# Patient Record
Sex: FEMALE | Race: WHITE | NOT HISPANIC OR LATINO | Employment: FULL TIME | URBAN - NONMETROPOLITAN AREA
[De-identification: names, ages, dates, MRNs, and addresses within clinical notes are randomized per-mention and may not be internally consistent; named-entity substitution may affect disease eponyms.]

---

## 2017-01-16 ENCOUNTER — TRANSCRIBE ORDERS (OUTPATIENT)
Dept: ADMINISTRATIVE | Facility: HOSPITAL | Age: 47
End: 2017-01-16

## 2017-01-16 ENCOUNTER — LAB (OUTPATIENT)
Dept: LAB | Facility: HOSPITAL | Age: 47
End: 2017-01-16

## 2017-01-16 DIAGNOSIS — Z00.00 ROUTINE GENERAL MEDICAL EXAMINATION AT A HEALTH CARE FACILITY: ICD-10-CM

## 2017-01-16 DIAGNOSIS — Z00.00 ROUTINE GENERAL MEDICAL EXAMINATION AT A HEALTH CARE FACILITY: Primary | ICD-10-CM

## 2017-01-16 LAB
ALBUMIN SERPL-MCNC: 4.1 G/DL (ref 3.5–5)
ALBUMIN/GLOB SERPL: 1.2 G/DL (ref 1.1–2.5)
ALP SERPL-CCNC: 100 U/L (ref 24–120)
ALT SERPL W P-5'-P-CCNC: 28 U/L (ref 0–54)
ANION GAP SERPL CALCULATED.3IONS-SCNC: 11 MMOL/L (ref 4–13)
AST SERPL-CCNC: 18 U/L (ref 7–45)
AUTO MIXED CELLS #: 0.6 10*3/UL (ref 0.1–2.6)
AUTO MIXED CELLS %: 10.6 % (ref 0.1–24)
BACTERIA UR QL AUTO: ABNORMAL /HPF
BILIRUB SERPL-MCNC: 0.4 MG/DL (ref 0.1–1)
BILIRUB UR QL STRIP: NEGATIVE
BUN BLD-MCNC: 9 MG/DL (ref 5–21)
BUN/CREAT SERPL: 16.4
CALCIUM SPEC-SCNC: 8.5 MG/DL (ref 8.4–10.4)
CHLORIDE SERPL-SCNC: 103 MMOL/L (ref 98–110)
CHOLEST SERPL-MCNC: 180 MG/DL (ref 130–200)
CLARITY UR: CLEAR
CO2 SERPL-SCNC: 24 MMOL/L (ref 24–31)
COLOR UR: YELLOW
CREAT BLD-MCNC: 0.55 MG/DL (ref 0.5–1.4)
ERYTHROCYTE [DISTWIDTH] IN BLOOD BY AUTOMATED COUNT: 14.4 % (ref 12–15)
GFR SERPL CREATININE-BSD FRML MDRD: 118 ML/MIN/1.73
GLOBULIN UR ELPH-MCNC: 3.5 GM/DL
GLUCOSE BLD-MCNC: 89 MG/DL (ref 70–100)
GLUCOSE UR STRIP-MCNC: NEGATIVE MG/DL
HCT VFR BLD AUTO: 35.6 % (ref 37–47)
HDLC SERPL-MCNC: 36 MG/DL
HGB BLD-MCNC: 11.5 G/DL (ref 12–16)
HGB UR QL STRIP.AUTO: ABNORMAL
HYALINE CASTS UR QL AUTO: ABNORMAL /LPF
KETONES UR QL STRIP: NEGATIVE
LDLC SERPL CALC-MCNC: 119 MG/DL (ref 0–99)
LDLC/HDLC SERPL: 3.29 {RATIO}
LEUKOCYTE ESTERASE UR QL STRIP.AUTO: NEGATIVE
LYMPHOCYTES # BLD AUTO: 1.6 10*3/MM3 (ref 0.8–7)
LYMPHOCYTES NFR BLD AUTO: 25.8 % (ref 15–45)
MCH RBC QN AUTO: 24.8 PG (ref 28–32)
MCHC RBC AUTO-ENTMCNC: 32.3 G/DL (ref 33–36)
MCV RBC AUTO: 76.9 FL (ref 82–98)
NEUTROPHILS # BLD AUTO: 3.9 10*3/MM3 (ref 1.5–8.3)
NEUTROPHILS NFR BLD AUTO: 63.6 % (ref 39–78)
NITRITE UR QL STRIP: NEGATIVE
PH UR STRIP.AUTO: 6 [PH] (ref 5–8)
PLATELET # BLD AUTO: 321 10*3/MM3 (ref 130–400)
PMV BLD AUTO: 8.4 FL (ref 6–12)
POTASSIUM BLD-SCNC: 4.3 MMOL/L (ref 3.5–5.3)
PROT SERPL-MCNC: 7.6 G/DL (ref 6.3–8.7)
PROT UR QL STRIP: NEGATIVE
RBC # BLD AUTO: 4.63 10*6/MM3 (ref 4.2–5.4)
RBC # UR: ABNORMAL /HPF
REF LAB TEST METHOD: ABNORMAL
SODIUM BLD-SCNC: 138 MMOL/L (ref 135–145)
SP GR UR STRIP: >=1.03 (ref 1–1.03)
SQUAMOUS #/AREA URNS HPF: ABNORMAL /HPF
TRIGL SERPL-MCNC: 127 MG/DL (ref 0–149)
TSH SERPL DL<=0.05 MIU/L-ACNC: 2.02 MIU/ML (ref 0.47–4.68)
UROBILINOGEN UR QL STRIP: ABNORMAL
VLDLC SERPL-MCNC: 25.4 MG/DL
WBC NRBC COR # BLD: 6.1 10*3/MM3 (ref 4.8–10.8)
WBC UR QL AUTO: ABNORMAL /HPF

## 2017-01-16 PROCEDURE — 84443 ASSAY THYROID STIM HORMONE: CPT | Performed by: NURSE PRACTITIONER

## 2017-01-16 PROCEDURE — 87624 HPV HI-RISK TYP POOLED RSLT: CPT | Performed by: NURSE PRACTITIONER

## 2017-01-16 PROCEDURE — 80061 LIPID PANEL: CPT

## 2017-01-16 PROCEDURE — 80053 COMPREHEN METABOLIC PANEL: CPT

## 2017-01-16 PROCEDURE — 87086 URINE CULTURE/COLONY COUNT: CPT | Performed by: NURSE PRACTITIONER

## 2017-01-16 PROCEDURE — 36415 COLL VENOUS BLD VENIPUNCTURE: CPT

## 2017-01-16 PROCEDURE — G0123 SCREEN CERV/VAG THIN LAYER: HCPCS | Performed by: NURSE PRACTITIONER

## 2017-01-16 PROCEDURE — 85025 COMPLETE CBC W/AUTO DIFF WBC: CPT

## 2017-01-16 PROCEDURE — 81001 URINALYSIS AUTO W/SCOPE: CPT

## 2017-01-17 ENCOUNTER — LAB REQUISITION (OUTPATIENT)
Dept: LAB | Facility: HOSPITAL | Age: 47
End: 2017-01-17

## 2017-01-17 DIAGNOSIS — Z00.00 ENCOUNTER FOR GENERAL ADULT MEDICAL EXAMINATION WITHOUT ABNORMAL FINDINGS: ICD-10-CM

## 2017-01-18 LAB — BACTERIA SPEC AEROBE CULT: ABNORMAL

## 2017-01-19 LAB
GEN CATEG CVX/VAG CYTO-IMP: NORMAL
LAB AP CASE REPORT: NORMAL
LAB AP GYN ADDITIONAL INFORMATION: NORMAL
Lab: NORMAL
PATH INTERP SPEC-IMP: NORMAL
STAT OF ADQ CVX/VAG CYTO-IMP: NORMAL

## 2017-02-20 ENCOUNTER — TRANSCRIBE ORDERS (OUTPATIENT)
Dept: ADMINISTRATIVE | Facility: HOSPITAL | Age: 47
End: 2017-02-20

## 2017-02-20 ENCOUNTER — LAB (OUTPATIENT)
Dept: LAB | Facility: HOSPITAL | Age: 47
End: 2017-02-20
Attending: PEDIATRICS

## 2017-02-20 DIAGNOSIS — R30.0 DYSURIA: ICD-10-CM

## 2017-02-20 DIAGNOSIS — R30.0 DYSURIA: Primary | ICD-10-CM

## 2017-02-20 LAB
BILIRUB UR QL STRIP: NEGATIVE
CLARITY UR: CLEAR
COLOR UR: YELLOW
GLUCOSE UR STRIP-MCNC: NEGATIVE MG/DL
HGB UR QL STRIP.AUTO: NEGATIVE
KETONES UR QL STRIP: NEGATIVE
LEUKOCYTE ESTERASE UR QL STRIP.AUTO: NEGATIVE
NITRITE UR QL STRIP: NEGATIVE
PH UR STRIP.AUTO: 6.5 [PH] (ref 5–8)
PROT UR QL STRIP: NEGATIVE
SP GR UR STRIP: 1.01 (ref 1–1.03)
UROBILINOGEN UR QL STRIP: NORMAL

## 2017-02-20 PROCEDURE — 81003 URINALYSIS AUTO W/O SCOPE: CPT

## 2017-03-17 ENCOUNTER — OFFICE VISIT (OUTPATIENT)
Dept: GASTROENTEROLOGY | Facility: CLINIC | Age: 47
End: 2017-03-17

## 2017-03-17 ENCOUNTER — APPOINTMENT (OUTPATIENT)
Dept: LAB | Facility: HOSPITAL | Age: 47
End: 2017-03-17

## 2017-03-17 VITALS
HEART RATE: 103 BPM | DIASTOLIC BLOOD PRESSURE: 70 MMHG | OXYGEN SATURATION: 97 % | WEIGHT: 147 LBS | BODY MASS INDEX: 27.75 KG/M2 | HEIGHT: 61 IN | SYSTOLIC BLOOD PRESSURE: 118 MMHG

## 2017-03-17 DIAGNOSIS — D64.9 ANEMIA, UNSPECIFIED TYPE: Primary | ICD-10-CM

## 2017-03-17 DIAGNOSIS — R19.5 HEME + STOOL: ICD-10-CM

## 2017-03-17 DIAGNOSIS — Z78.9 NONSMOKER: ICD-10-CM

## 2017-03-17 LAB
FERRITIN SERPL-MCNC: 4.25 NG/ML (ref 6.24–137)
IRON 24H UR-MRATE: 28 MCG/DL (ref 42–180)
IRON SATN MFR SERPL: 6 % (ref 20–45)
TIBC SERPL-MCNC: 460 MCG/DL (ref 225–420)

## 2017-03-17 PROCEDURE — 36415 COLL VENOUS BLD VENIPUNCTURE: CPT | Performed by: CLINICAL NURSE SPECIALIST

## 2017-03-17 PROCEDURE — 99204 OFFICE O/P NEW MOD 45 MIN: CPT | Performed by: CLINICAL NURSE SPECIALIST

## 2017-03-17 PROCEDURE — 83540 ASSAY OF IRON: CPT | Performed by: CLINICAL NURSE SPECIALIST

## 2017-03-17 PROCEDURE — 83550 IRON BINDING TEST: CPT | Performed by: CLINICAL NURSE SPECIALIST

## 2017-03-17 PROCEDURE — 82728 ASSAY OF FERRITIN: CPT | Performed by: CLINICAL NURSE SPECIALIST

## 2017-03-17 RX ORDER — SODIUM, POTASSIUM,MAG SULFATES 17.5-3.13G
SOLUTION, RECONSTITUTED, ORAL ORAL
Qty: 2 BOTTLE | Refills: 0 | Status: ON HOLD | OUTPATIENT
Start: 2017-03-17 | End: 2017-04-13

## 2017-03-17 NOTE — PROGRESS NOTES
Deysi Valentine  1970    3/17/2017  Chief Complaint   Patient presents with   • GI Problem     New patient ref by Dr. Cullen for anemia     Subjective   HPI  Deysi Valentine is a 46 y.o. female who presents with a complaint of anemia. No signs of visible bleeding. No change in bowels. No upper GI complaints. No nausea or vomiting. No wt loss. No upper GI complaints. No change in bowels. No constipation or diarrhea. No BRBPR. NO melena. This was an incidental finding on routine labs. Chronicity is unknown. 1/16/2017 labs H/H/11.5/35.6 MCV 76.9. She says that they did tell her that stool cards for occult blood were positive x3. No previous GI workup to include Endo/colon. No family hx for colon cancer or diseases.     Past Medical History   Diagnosis Date   • Anemia    • Arrhythmia    • Depression      Past Surgical History   Procedure Laterality Date   • Laser ablation condyloma cervical / vulvar       Outpatient Prescriptions Marked as Taking for the 3/17/17 encounter (Office Visit) with CAMMY Longoria   Medication Sig Dispense Refill   • desvenlafaxine (PRISTIQ) 50 MG 24 hr tablet Take 50 mg by mouth Daily.       No Known Allergies  Social History     Social History   • Marital status:      Spouse name: N/A   • Number of children: N/A   • Years of education: N/A     Occupational History   • Not on file.     Social History Main Topics   • Smoking status: Never Smoker   • Smokeless tobacco: Not on file   • Alcohol use No   • Drug use: Not on file   • Sexual activity: Not on file     Other Topics Concern   • Not on file     Social History Narrative     Family History   Problem Relation Age of Onset   • Colon cancer Neg Hx    • Colon polyps Neg Hx      Health Maintenance   Topic Date Due   • TDAP/TD VACCINES (1 - Tdap) 08/28/1988   • INFLUENZA VACCINE  08/01/2016     Review of Systems   Constitutional: Negative for activity change, appetite change, chills, diaphoresis, fatigue, fever and unexpected weight  "change.   HENT: Negative for ear pain, hearing loss, mouth sores, sore throat, trouble swallowing and voice change.    Eyes: Negative.    Respiratory: Negative for cough, choking, shortness of breath and wheezing.    Cardiovascular: Negative for chest pain and palpitations.   Gastrointestinal: Negative for abdominal pain, blood in stool, constipation, diarrhea, nausea and vomiting.   Endocrine: Negative for cold intolerance and heat intolerance.   Genitourinary: Negative for decreased urine volume, dysuria, frequency, hematuria and urgency.   Musculoskeletal: Negative for back pain, gait problem and myalgias.   Skin: Negative for color change, pallor and rash.   Allergic/Immunologic: Negative for food allergies and immunocompromised state.   Neurological: Negative for dizziness, tremors, seizures, syncope, weakness, light-headedness, numbness and headaches.   Hematological: Negative for adenopathy. Does not bruise/bleed easily.   Psychiatric/Behavioral: Negative for agitation and confusion. The patient is not nervous/anxious.    All other systems reviewed and are negative.    Objective   Vitals:    03/17/17 0816   BP: 118/70   Pulse: 103   SpO2: 97%   Weight: 147 lb (66.7 kg)   Height: 61\" (154.9 cm)     Body mass index is 27.78 kg/(m^2).  Physical Exam   Constitutional: She is oriented to person, place, and time. She appears well-developed and well-nourished.   HENT:   Head: Normocephalic and atraumatic.   Eyes: Pupils are equal, round, and reactive to light.   Neck: Normal range of motion. Neck supple. No tracheal deviation present.   Cardiovascular: Normal rate, regular rhythm and normal heart sounds.  Exam reveals no gallop and no friction rub.    No murmur heard.  Pulmonary/Chest: Effort normal and breath sounds normal. No respiratory distress. She has no wheezes. She has no rales. She exhibits no tenderness.   Abdominal: Soft. Bowel sounds are normal. She exhibits no distension. There is no hepatosplenomegaly. " There is no tenderness. There is no rigidity, no rebound and no guarding.   Musculoskeletal: Normal range of motion. She exhibits no edema, tenderness or deformity.   Neurological: She is alert and oriented to person, place, and time. She has normal reflexes.   Skin: Skin is warm and dry. No rash noted. No pallor.   Psychiatric: She has a normal mood and affect. Her behavior is normal. Judgment and thought content normal.     Assessment/Plan   Deysi was seen today for gi problem.    Diagnoses and all orders for this visit:    Anemia, unspecified type  -     Iron Profile  -     Ferritin  -     SUPREP BOWEL PREP solution oral solution; Take as directed by office instructions provided  -     Case Request; Standing  -     Implement Anesthesia Orders Day of Procedure; Standing  -     Obtain Informed Consent; Standing  -     Verify Informed Consent; Standing  -     Case Request  -     Case Request; Standing  -     Implement Anesthesia Orders Day of Procedure; Standing  -     Obtain Informed Consent; Standing  -     Verify Informed Consent; Standing  -     Verify bowel prep was successful; Standing  -     Case Request    Heme + stool    Nonsmoker      Will get iron profile to determine if iron def anemia.     ESOPHAGOGASTRODUODENOSCOPY WITH ANESTHESIA (N/A)  EMR Dragon/transcription disclaimer: Much of this encounter note is electronic transcription/translation of spoken language to printed text. The electronic translation of spoken language may be erroneous, or at times, nonsensical words or phrases may be inadvertently transcribed. Although I have reviewed the note for such errors, some may still exist.  Body mass index is 27.78 kg/(m^2).  Return in about 3 months (around 6/17/2017).      All risks, benefits, alternatives, and indications of colonoscopy and/or Endoscopy procedure have been discussed with the patient. Risks to include perforation of the colon requiring possible surgery or colostomy, risk of bleeding from  biopsies or removal of colon tissue, possibility of missing a colon polyp or cancer, or adverse drug reaction.  Benefits to include the diagnosis and management of disease of the colon and rectum. Alternatives to include barium enema, radiographic evaluation, lab testing or no intervention. Pt verbalizes understanding and agrees.

## 2017-04-11 ENCOUNTER — ANESTHESIA EVENT (OUTPATIENT)
Dept: GASTROENTEROLOGY | Facility: HOSPITAL | Age: 47
End: 2017-04-11

## 2017-04-13 ENCOUNTER — ANESTHESIA (OUTPATIENT)
Dept: GASTROENTEROLOGY | Facility: HOSPITAL | Age: 47
End: 2017-04-13

## 2017-04-13 ENCOUNTER — HOSPITAL ENCOUNTER (OUTPATIENT)
Facility: HOSPITAL | Age: 47
Setting detail: HOSPITAL OUTPATIENT SURGERY
Discharge: HOME | End: 2017-04-13
Attending: INTERNAL MEDICINE | Admitting: INTERNAL MEDICINE

## 2017-04-13 VITALS
SYSTOLIC BLOOD PRESSURE: 123 MMHG | WEIGHT: 143 LBS | RESPIRATION RATE: 20 BRPM | OXYGEN SATURATION: 100 % | HEIGHT: 61 IN | HEART RATE: 82 BPM | DIASTOLIC BLOOD PRESSURE: 76 MMHG | TEMPERATURE: 99.1 F | BODY MASS INDEX: 27 KG/M2

## 2017-04-13 DIAGNOSIS — D64.9 ANEMIA, UNSPECIFIED TYPE: ICD-10-CM

## 2017-04-13 LAB — B-HCG UR QL: NEGATIVE

## 2017-04-13 PROCEDURE — 25010000002 PROPOFOL 10 MG/ML EMULSION: Performed by: NURSE ANESTHETIST, CERTIFIED REGISTERED

## 2017-04-13 PROCEDURE — 88305 TISSUE EXAM BY PATHOLOGIST: CPT | Performed by: INTERNAL MEDICINE

## 2017-04-13 PROCEDURE — 81025 URINE PREGNANCY TEST: CPT | Performed by: NURSE ANESTHETIST, CERTIFIED REGISTERED

## 2017-04-13 PROCEDURE — 45385 COLONOSCOPY W/LESION REMOVAL: CPT | Performed by: INTERNAL MEDICINE

## 2017-04-13 PROCEDURE — 43239 EGD BIOPSY SINGLE/MULTIPLE: CPT | Performed by: INTERNAL MEDICINE

## 2017-04-13 RX ORDER — SODIUM CHLORIDE 0.9 % (FLUSH) 0.9 %
1-10 SYRINGE (ML) INJECTION AS NEEDED
Status: DISCONTINUED | OUTPATIENT
Start: 2017-04-13 | End: 2017-04-13 | Stop reason: HOSPADM

## 2017-04-13 RX ORDER — SODIUM CHLORIDE 9 MG/ML
100 INJECTION, SOLUTION INTRAVENOUS CONTINUOUS
Status: DISCONTINUED | OUTPATIENT
Start: 2017-04-13 | End: 2017-04-13 | Stop reason: HOSPADM

## 2017-04-13 RX ORDER — PROPOFOL 10 MG/ML
VIAL (ML) INTRAVENOUS AS NEEDED
Status: DISCONTINUED | OUTPATIENT
Start: 2017-04-13 | End: 2017-04-13 | Stop reason: SURG

## 2017-04-13 RX ORDER — LIDOCAINE HYDROCHLORIDE 20 MG/ML
INJECTION, SOLUTION INFILTRATION; PERINEURAL AS NEEDED
Status: DISCONTINUED | OUTPATIENT
Start: 2017-04-13 | End: 2017-04-13 | Stop reason: SURG

## 2017-04-13 RX ADMIN — PROPOFOL 40 MG: 10 INJECTION, EMULSION INTRAVENOUS at 11:49

## 2017-04-13 RX ADMIN — PROPOFOL 120 MG: 10 INJECTION, EMULSION INTRAVENOUS at 11:33

## 2017-04-13 RX ADMIN — SODIUM CHLORIDE 100 ML/HR: 9 INJECTION, SOLUTION INTRAVENOUS at 11:26

## 2017-04-13 RX ADMIN — PROPOFOL 40 MG: 10 INJECTION, EMULSION INTRAVENOUS at 11:43

## 2017-04-13 RX ADMIN — PROPOFOL 40 MG: 10 INJECTION, EMULSION INTRAVENOUS at 11:45

## 2017-04-13 RX ADMIN — PROPOFOL 40 MG: 10 INJECTION, EMULSION INTRAVENOUS at 11:37

## 2017-04-13 RX ADMIN — LIDOCAINE HYDROCHLORIDE 60 MG: 20 INJECTION, SOLUTION INFILTRATION; PERINEURAL at 11:33

## 2017-04-13 RX ADMIN — PROPOFOL 40 MG: 10 INJECTION, EMULSION INTRAVENOUS at 11:40

## 2017-04-13 RX ADMIN — PROPOFOL 40 MG: 10 INJECTION, EMULSION INTRAVENOUS at 11:47

## 2017-04-13 NOTE — PLAN OF CARE
Problem: Patient Care Overview (Adult)  Goal: Plan of Care Review  Outcome: Outcome(s) achieved Date Met:  04/13/17 04/13/17 1155   Patient Care Overview   Progress improving   Outcome Evaluation   Outcome Summary/Follow up Plan D/C CRITERIA MET   Coping/Psychosocial Response Interventions   Plan Of Care Reviewed With patient

## 2017-04-13 NOTE — ANESTHESIA PREPROCEDURE EVALUATION
Anesthesia Evaluation     Patient summary reviewed and Nursing notes reviewed   no history of anesthetic complications:  NPO Status: > 2 hours   Airway   Mallampati: I  TM distance: >3 FB  Neck ROM: full  no difficulty expected  Dental      Pulmonary     breath sounds clear to auscultation  (-) asthma, sleep apnea, not a smoker  Cardiovascular     Rhythm: regular  Rate: normal    (+) dysrhythmias (nonsustained vtach- had ablation 2008, no issues since then),       Neuro/Psych  (+) psychiatric history Depression,    (-) seizures, TIA, CVA  GI/Hepatic/Renal/Endo    (-) liver disease, renal disease, diabetes    Musculoskeletal     Abdominal    Substance History      OB/GYN          Other          Other Comment: Anemia- colonoscopy to evaluate                              Anesthesia Plan    ASA 2     general     intravenous induction   Anesthetic plan and risks discussed with patient.

## 2017-04-13 NOTE — ANESTHESIA POSTPROCEDURE EVALUATION
Patient: Deysi Valentine    Procedure Summary     Date Anesthesia Start Anesthesia Stop Room / Location    04/13/17 1124 1153 [facility] ENDOSCOPY 4 / [facility]       Procedure Diagnosis Surgeon Provider    COLONOSCOPY WITH ANESTHESIA (N/A ); ESOPHAGOGASTRODUODENOSCOPY WITH ANESTHESIA (N/A Esophagus) Anemia, unspecified type  (Anemia, unspecified type [D64.9]) MD Tomas Fox CRNA          Anesthesia Type: general  Last vitals  BP      Temp      Pulse     Resp      SpO2        Post Anesthesia Care and Evaluation    Patient location during evaluation: PHASE II  Patient participation: complete - patient cannot participate  Level of consciousness: responsive to light touch  Pain score: 0  Pain management: adequate  Airway patency: patent  Anesthetic complications: No anesthetic complications  PONV Status: none  Cardiovascular status: acceptable  Respiratory status: acceptable  Hydration status: acceptable

## 2017-04-13 NOTE — PLAN OF CARE
Problem: GI Endoscopy (Adult)  Goal: Signs and Symptoms of Listed Potential Problems Will be Absent or Manageable (GI Endoscopy)  Outcome: Outcome(s) achieved Date Met:  04/13/17

## 2017-04-13 NOTE — PLAN OF CARE
Problem: Patient Care Overview (Adult)  Goal: Plan of Care Review  Outcome: Ongoing (interventions implemented as appropriate)    04/13/17 1143   Patient Care Overview   Progress improving   Outcome Evaluation   Outcome Summary/Follow up Plan pt tolerating procedure well          Problem: GI Endoscopy (Adult)  Goal: Signs and Symptoms of Listed Potential Problems Will be Absent or Manageable (GI Endoscopy)  Outcome: Ongoing (interventions implemented as appropriate)    04/13/17 1143   GI Endoscopy   Problems Assessed (GI Endoscopy) all   Problems Present (GI Endoscopy) none

## 2017-04-14 LAB
CYTO UR: NORMAL
LAB AP CASE REPORT: NORMAL
LAB AP CLINICAL INFORMATION: NORMAL
Lab: NORMAL
PATH REPORT.FINAL DX SPEC: NORMAL
PATH REPORT.GROSS SPEC: NORMAL

## 2017-04-25 DIAGNOSIS — K90.0 CELIAC DISEASE: Primary | ICD-10-CM

## 2017-05-09 ENCOUNTER — LAB (OUTPATIENT)
Dept: LAB | Facility: HOSPITAL | Age: 47
End: 2017-05-09
Attending: INTERNAL MEDICINE

## 2017-05-09 ENCOUNTER — TRANSCRIBE ORDERS (OUTPATIENT)
Dept: LAB | Facility: HOSPITAL | Age: 47
End: 2017-05-09

## 2017-05-09 ENCOUNTER — LAB (OUTPATIENT)
Dept: LAB | Facility: HOSPITAL | Age: 47
End: 2017-05-09
Attending: PEDIATRICS

## 2017-05-09 DIAGNOSIS — D64.9 ANEMIA, UNSPECIFIED: ICD-10-CM

## 2017-05-09 DIAGNOSIS — K90.0 CELIAC DISEASE: ICD-10-CM

## 2017-05-09 DIAGNOSIS — D64.9 ANEMIA, UNSPECIFIED: Primary | ICD-10-CM

## 2017-05-09 LAB
ALBUMIN SERPL-MCNC: 4.1 G/DL (ref 3.5–5)
ALBUMIN/GLOB SERPL: 1.2 G/DL (ref 1.1–2.5)
ALP SERPL-CCNC: 100 U/L (ref 24–120)
ALT SERPL W P-5'-P-CCNC: 21 U/L (ref 0–54)
ANION GAP SERPL CALCULATED.3IONS-SCNC: 12 MMOL/L (ref 4–13)
AST SERPL-CCNC: 18 U/L (ref 7–45)
AUTO MIXED CELLS #: 0.7 10*3/UL (ref 0.1–2.6)
AUTO MIXED CELLS %: 11.2 % (ref 0.1–24)
BILIRUB SERPL-MCNC: 0.4 MG/DL (ref 0.1–1)
BILIRUB UR QL STRIP: NEGATIVE
BUN BLD-MCNC: 11 MG/DL (ref 5–21)
BUN/CREAT SERPL: 18.6
CALCIUM SPEC-SCNC: 8.5 MG/DL (ref 8.4–10.4)
CHLORIDE SERPL-SCNC: 107 MMOL/L (ref 98–110)
CLARITY UR: CLEAR
CO2 SERPL-SCNC: 23 MMOL/L (ref 24–31)
COLOR UR: YELLOW
CREAT BLD-MCNC: 0.59 MG/DL (ref 0.5–1.4)
ERYTHROCYTE [DISTWIDTH] IN BLOOD BY AUTOMATED COUNT: 14.6 % (ref 12–15)
FERRITIN SERPL-MCNC: 3.73 NG/ML (ref 6.24–137)
FOLATE SERPL-MCNC: 15.5 NG/ML
GFR SERPL CREATININE-BSD FRML MDRD: 109 ML/MIN/1.73
GLOBULIN UR ELPH-MCNC: 3.3 GM/DL
GLUCOSE BLD-MCNC: 95 MG/DL (ref 70–100)
GLUCOSE UR STRIP-MCNC: NEGATIVE MG/DL
HCT VFR BLD AUTO: 34.4 % (ref 37–47)
HGB BLD-MCNC: 11.4 G/DL (ref 12–16)
HGB UR QL STRIP.AUTO: NEGATIVE
IRON 24H UR-MRATE: 26 MCG/DL (ref 42–180)
IRON SATN MFR SERPL: 6 % (ref 20–45)
KETONES UR QL STRIP: NEGATIVE
LEUKOCYTE ESTERASE UR QL STRIP.AUTO: NEGATIVE
LYMPHOCYTES # BLD AUTO: 1.4 10*3/MM3 (ref 0.8–7)
LYMPHOCYTES NFR BLD AUTO: 21.1 % (ref 15–45)
MCH RBC QN AUTO: 24.8 PG (ref 28–32)
MCHC RBC AUTO-ENTMCNC: 33.1 G/DL (ref 33–36)
MCV RBC AUTO: 74.9 FL (ref 82–98)
NEUTROPHILS # BLD AUTO: 4.4 10*3/MM3 (ref 1.5–8.3)
NEUTROPHILS NFR BLD AUTO: 67.7 % (ref 39–78)
NITRITE UR QL STRIP: NEGATIVE
PH UR STRIP.AUTO: 6 [PH] (ref 5–8)
PLATELET # BLD AUTO: 298 10*3/MM3 (ref 130–400)
PMV BLD AUTO: 8 FL (ref 6–12)
POTASSIUM BLD-SCNC: 4.2 MMOL/L (ref 3.5–5.3)
PROT SERPL-MCNC: 7.4 G/DL (ref 6.3–8.7)
PROT UR QL STRIP: NEGATIVE
RBC # BLD AUTO: 4.59 10*6/MM3 (ref 4.2–5.4)
SODIUM BLD-SCNC: 142 MMOL/L (ref 135–145)
SP GR UR STRIP: 1.02 (ref 1–1.03)
T3FREE SERPL-MCNC: 3.26 PG/ML (ref 2.77–5.27)
T4 FREE SERPL-MCNC: 0.87 NG/DL (ref 0.78–2.19)
TIBC SERPL-MCNC: 458 MCG/DL (ref 225–420)
TSH SERPL DL<=0.05 MIU/L-ACNC: 1.91 MIU/ML (ref 0.47–4.68)
UROBILINOGEN UR QL STRIP: NORMAL
VIT B12 BLD-MCNC: 334 PG/ML (ref 239–931)
WBC NRBC COR # BLD: 6.5 10*3/MM3 (ref 4.8–10.8)

## 2017-05-09 PROCEDURE — 82607 VITAMIN B-12: CPT | Performed by: PEDIATRICS

## 2017-05-09 PROCEDURE — 36415 COLL VENOUS BLD VENIPUNCTURE: CPT

## 2017-05-09 PROCEDURE — 84443 ASSAY THYROID STIM HORMONE: CPT | Performed by: PEDIATRICS

## 2017-05-09 PROCEDURE — 83540 ASSAY OF IRON: CPT | Performed by: PEDIATRICS

## 2017-05-09 PROCEDURE — 83550 IRON BINDING TEST: CPT | Performed by: PEDIATRICS

## 2017-05-09 PROCEDURE — 86255 FLUORESCENT ANTIBODY SCREEN: CPT | Performed by: INTERNAL MEDICINE

## 2017-05-09 PROCEDURE — 80053 COMPREHEN METABOLIC PANEL: CPT

## 2017-05-09 PROCEDURE — 82728 ASSAY OF FERRITIN: CPT | Performed by: PEDIATRICS

## 2017-05-09 PROCEDURE — 81003 URINALYSIS AUTO W/O SCOPE: CPT

## 2017-05-09 PROCEDURE — 85025 COMPLETE CBC W/AUTO DIFF WBC: CPT

## 2017-05-09 PROCEDURE — 84481 FREE ASSAY (FT-3): CPT | Performed by: PEDIATRICS

## 2017-05-09 PROCEDURE — 82746 ASSAY OF FOLIC ACID SERUM: CPT | Performed by: PEDIATRICS

## 2017-05-09 PROCEDURE — 84439 ASSAY OF FREE THYROXINE: CPT | Performed by: PEDIATRICS

## 2017-05-10 LAB
ENDOMYSIUM IGA SER QL: NEGATIVE
IGA SERPL-MCNC: 113 MG/DL (ref 87–352)
TTG IGA SER-ACNC: 8 U/ML (ref 0–3)

## 2017-05-12 ENCOUNTER — TELEPHONE (OUTPATIENT)
Dept: GASTROENTEROLOGY | Facility: CLINIC | Age: 47
End: 2017-05-12

## 2017-05-12 DIAGNOSIS — K90.0 CELIAC DISEASE: Primary | ICD-10-CM

## 2017-05-16 ENCOUNTER — TELEPHONE (OUTPATIENT)
Dept: GASTROENTEROLOGY | Facility: CLINIC | Age: 47
End: 2017-05-16

## 2017-05-24 ENCOUNTER — APPOINTMENT (OUTPATIENT)
Dept: NUTRITION | Facility: HOSPITAL | Age: 47
End: 2017-05-24
Attending: INTERNAL MEDICINE

## 2017-06-07 ENCOUNTER — HOSPITAL ENCOUNTER (OUTPATIENT)
Dept: NUTRITION | Facility: HOSPITAL | Age: 47
End: 2017-06-07
Attending: INTERNAL MEDICINE

## 2017-09-19 ENCOUNTER — OFFICE VISIT (OUTPATIENT)
Dept: GASTROENTEROLOGY | Facility: CLINIC | Age: 47
End: 2017-09-19

## 2017-09-19 ENCOUNTER — LAB (OUTPATIENT)
Dept: LAB | Facility: HOSPITAL | Age: 47
End: 2017-09-19
Attending: INTERNAL MEDICINE

## 2017-09-19 VITALS
HEIGHT: 61 IN | WEIGHT: 145 LBS | HEART RATE: 105 BPM | BODY MASS INDEX: 27.38 KG/M2 | OXYGEN SATURATION: 98 % | SYSTOLIC BLOOD PRESSURE: 136 MMHG | DIASTOLIC BLOOD PRESSURE: 84 MMHG

## 2017-09-19 DIAGNOSIS — K90.0 CELIAC DISEASE: Primary | ICD-10-CM

## 2017-09-19 DIAGNOSIS — K90.0 CELIAC DISEASE: ICD-10-CM

## 2017-09-19 LAB
BASOPHILS # BLD AUTO: 0.01 10*3/MM3 (ref 0–0.2)
BASOPHILS NFR BLD AUTO: 0.1 % (ref 0–2)
DEPRECATED RDW RBC AUTO: 42.7 FL (ref 40–54)
EOSINOPHIL # BLD AUTO: 0.09 10*3/MM3 (ref 0–0.7)
EOSINOPHIL NFR BLD AUTO: 1.2 % (ref 0–4)
ERYTHROCYTE [DISTWIDTH] IN BLOOD BY AUTOMATED COUNT: 15.7 % (ref 12–15)
HCT VFR BLD AUTO: 35.4 % (ref 37–47)
HGB BLD-MCNC: 11.2 G/DL (ref 12–16)
IMM GRANULOCYTES # BLD: 0.01 10*3/MM3 (ref 0–0.03)
IMM GRANULOCYTES NFR BLD: 0.1 % (ref 0–5)
LYMPHOCYTES # BLD AUTO: 1.64 10*3/MM3 (ref 0.72–4.86)
LYMPHOCYTES NFR BLD AUTO: 21.3 % (ref 15–45)
MCH RBC QN AUTO: 23.7 PG (ref 28–32)
MCHC RBC AUTO-ENTMCNC: 31.6 G/DL (ref 33–36)
MCV RBC AUTO: 74.8 FL (ref 82–98)
MICROCYTES BLD QL: NORMAL
MONOCYTES # BLD AUTO: 0.72 10*3/MM3 (ref 0.19–1.3)
MONOCYTES NFR BLD AUTO: 9.4 % (ref 4–12)
NEUTROPHILS # BLD AUTO: 5.23 10*3/MM3 (ref 1.87–8.4)
NEUTROPHILS NFR BLD AUTO: 67.9 % (ref 39–78)
PLAT MORPH BLD: NORMAL
PLATELET # BLD AUTO: 311 10*3/MM3 (ref 130–400)
PMV BLD AUTO: 9.4 FL (ref 6–12)
RBC # BLD AUTO: 4.73 10*6/MM3 (ref 4.2–5.4)
WBC MORPH BLD: NORMAL
WBC NRBC COR # BLD: 7.7 10*3/MM3 (ref 4.8–10.8)

## 2017-09-19 PROCEDURE — 36415 COLL VENOUS BLD VENIPUNCTURE: CPT

## 2017-09-19 PROCEDURE — 85025 COMPLETE CBC W/AUTO DIFF WBC: CPT | Performed by: INTERNAL MEDICINE

## 2017-09-19 PROCEDURE — 83516 IMMUNOASSAY NONANTIBODY: CPT | Performed by: INTERNAL MEDICINE

## 2017-09-19 PROCEDURE — 85007 BL SMEAR W/DIFF WBC COUNT: CPT | Performed by: INTERNAL MEDICINE

## 2017-09-19 PROCEDURE — 99212 OFFICE O/P EST SF 10 MIN: CPT | Performed by: INTERNAL MEDICINE

## 2017-09-19 RX ORDER — SULFAMETHOXAZOLE AND TRIMETHOPRIM 800; 160 MG/1; MG/1
TABLET ORAL
Refills: 0 | COMMUNITY
Start: 2017-09-16 | End: 2017-11-29

## 2017-09-19 NOTE — PROGRESS NOTES
[facility] Gastroenterology - Office note  9/19/2017    Deysi Valentine 1970     Chief Complaint   Patient presents with   • Follow-up     Celiac       HISTORY OF PRESENT ILLNESS    Deysi Valentine is a 47 y.o. female who presents For follow-up.  Biopsies from her small bowel were consistent with celiac disease.  Her tissue transglutaminase was positive as well.  Everything is consistent with celiac disease.  She tells me that she is compliant with the diet except occasionally she will cheat.  Most days she is adherent to the gluten-free diet.  He is having no symptoms at this time.    Past Medical History:   Diagnosis Date   • Anemia    • Arrhythmia    • Celiac disease    • Depression    • Hx of colonic polyps        Past Surgical History:   Procedure Laterality Date   • CARDIAC ABLATION     • COLONOSCOPY N/A 4/13/2017    Mild chronic inflammation changes constistent with sprue, Hyperplastic polyp 15 cm proximal to the anus repeat exam in 10 years   • ENDOSCOPY N/A 4/13/2017    LA Grade A reflux esophagitis         Current Outpatient Prescriptions:   •  desvenlafaxine (PRISTIQ) 50 MG 24 hr tablet, Take 50 mg by mouth Daily., Disp: , Rfl:   •  sulfamethoxazole-trimethoprim (BACTRIM DS,SEPTRA DS) 800-160 MG per tablet, TK 1 T PO BID FOR 7 DAYS, Disp: , Rfl: 0    No Known Allergies    Social History     Social History   • Marital status:      Spouse name: N/A   • Number of children: N/A   • Years of education: N/A     Occupational History   • Not on file.     Social History Main Topics   • Smoking status: Never Smoker   • Smokeless tobacco: Never Used   • Alcohol use No   • Drug use: No   • Sexual activity: Defer     Other Topics Concern   • Not on file     Social History Narrative       Family History   Problem Relation Age of Onset   • Colon cancer Neg Hx    • Colon polyps Neg Hx        Review of Systems   Cardiovascular: Negative.    Gastrointestinal: Negative.    Genitourinary: Negative.        Vitals:     "09/19/17 1505   BP: 136/84   Pulse: 105   SpO2: 98%   Weight: 145 lb (65.8 kg)   Height: 61\" (154.9 cm)    Body mass index is 27.4 kg/(m^2).    Physical Exam   HENT:   Head: Normocephalic.   Neurological: She is alert.         Lab Results - Last 18 Months  Lab Units 05/09/17  0836   WBC 10*3/mm3 6.50   HEMOGLOBIN g/dL 11.4*   HEMATOCRIT % 34.4*   MCV fL 74.9*   PLATELETS 10*3/mm3 298   GLUCOSE mg/dL 95   BUN mg/dL 11   CREATININE mg/dL 0.59   SODIUM mmol/L 142   POTASSIUM mmol/L 4.2   CHLORIDE mmol/L 107   CO2 mmol/L 23.0*   TOTAL PROTEIN g/dL 7.4   ALBUMIN g/dL 4.10   ALT (SGPT) U/L 21   AST (SGOT) U/L 18   ALK PHOS U/L 100   BILIRUBIN mg/dL 0.4   GLOBULIN gm/dL 3.3         ASSESSMENT AND PLAN      1. Celiac disease  Recheck labs today.  I explained the reasoning behind staying adherent to the diet.  I will plan to see her back in 3 months.  - CBC & Differential  - Celiac Ab tTG DGP TIgA; Future       EMR Dragon/transcription disclaimer: Much of this encounter note is an electronic transcription/translation of spoken language to printed text.  The electronic translation of spoken language may permit erroneous, or at times, nonsensical words or phrases to be inadvertently transcribed.  Although I have reviewed the note for such errors, some may still exist.    Yuniel Painting MD  9/19/2017  3:20 PM  "

## 2017-09-21 LAB
GLIADIN PEPTIDE IGA SER-ACNC: 6 UNITS (ref 0–19)
GLIADIN PEPTIDE IGG SER-ACNC: 15 UNITS (ref 0–19)
IGA SERPL-MCNC: 106 MG/DL (ref 87–352)
TTG IGA SER-ACNC: 2 U/ML (ref 0–3)
TTG IGG SER-ACNC: 8 U/ML (ref 0–5)

## 2017-11-29 ENCOUNTER — OFFICE VISIT (OUTPATIENT)
Dept: OBSTETRICS AND GYNECOLOGY | Facility: CLINIC | Age: 47
End: 2017-11-29

## 2017-11-29 VITALS
BODY MASS INDEX: 28.07 KG/M2 | WEIGHT: 143 LBS | DIASTOLIC BLOOD PRESSURE: 85 MMHG | HEIGHT: 60 IN | SYSTOLIC BLOOD PRESSURE: 132 MMHG

## 2017-11-29 DIAGNOSIS — D64.9 ANEMIA, UNSPECIFIED TYPE: ICD-10-CM

## 2017-11-29 DIAGNOSIS — F33.41 RECURRENT MAJOR DEPRESSIVE DISORDER, IN PARTIAL REMISSION (HCC): Primary | Chronic | ICD-10-CM

## 2017-11-29 PROCEDURE — 99204 OFFICE O/P NEW MOD 45 MIN: CPT | Performed by: OBSTETRICS & GYNECOLOGY

## 2017-11-29 RX ORDER — DESVENLAFAXINE SUCCINATE 50 MG/1
50 TABLET, EXTENDED RELEASE ORAL DAILY
Qty: 90 TABLET | Refills: 4 | Status: SHIPPED | OUTPATIENT
Start: 2017-11-29 | End: 2018-12-12 | Stop reason: SDUPTHER

## 2017-11-30 NOTE — PROGRESS NOTES
Deysi Valentine is a 47 y.o. y/o female     Chief Complaint: Establish care, manage depression and other gynecologic issues    HPI: 47-year-old  with three prior vaginal deliveries.  Her  has a vasectomy.  She is here to establish care and to have her depression managed as well as management of gynecologic, hormonal, and menstrual issues.    Deysi's  is a [employer]  in [place].    She is a pharmacy tech.    She has a nonsmoker.    She has a history of cardiac supraventricular tachycardia, but she is not having any problems with this since ablation therapy in 2008.    She has recently been diagnosed with celiac disease.  She manages this well with a gluten-free diet.    She has had some problems with anemia.    She has had some fairly significant issues with depression and finds that Pristiq 50 mg per day works well for her.    Last menstrual period 2017.  Her periods last about 4 days.  They're not particularly heavy.  She has no intermenstrual bleeding.  Her periods are regular.    They have 3 boys.  Sagar is a senior at [employer] and plans on attending [employer].  Jose is a senior in high school  Dakota is 12 years old and in the sixth grade.    Her family history is significant for mother  at age 65.  Father  at age 84.  She has a sister who  at age 61.    I will request her medical records.    Review of Systems   Constitutional: Positive for fatigue. Negative for activity change, appetite change, chills, diaphoresis, fever and unexpected weight change.   Gastrointestinal: Negative for abdominal pain, constipation, diarrhea and nausea.   Genitourinary: Negative for difficulty urinating, dyspareunia, dysuria, menstrual problem, pelvic pain, urgency, vaginal bleeding, vaginal discharge and vaginal pain.   Neurological: Negative for headaches.   Psychiatric/Behavioral: Positive for dysphoric mood. The patient is nervous/anxious.    All other systems reviewed  and are negative.     Breast ROS: negative    The following portions of the patient's history were reviewed and updated as appropriate: allergies, current medications, past family history, past medical history, past social history, past surgical history and problem list.    No Known Allergies       Current Outpatient Prescriptions:   •  desvenlafaxine (PRISTIQ) 50 MG 24 hr tablet, Take 1 tablet by mouth Daily., Disp: 90 tablet, Rfl: 4     The patient has a family history of   Family History   Problem Relation Age of Onset   • Heart attack Father    • Depression Mother    • Heart attack Mother    • Colon cancer Neg Hx    • Colon polyps Neg Hx         Past Medical History:   Diagnosis Date   • Anemia    • Arrhythmia    • Celiac disease    • Depression    • Hx of colonic polyps    • Recurrent major depressive disorder, in partial remission 2017        OB History      Para Term  AB Living    3 3    3    SAB TAB Ectopic Multiple Live Births                   Social History     Social History   • Marital status:      Spouse name: N/A   • Number of children: N/A   • Years of education: N/A     Occupational History   • Not on file.     Social History Main Topics   • Smoking status: Never Smoker   • Smokeless tobacco: Never Used   • Alcohol use No   • Drug use: No   • Sexual activity: Yes     Birth control/ protection: None     Other Topics Concern   • Not on file     Social History Narrative        Past Surgical History:   Procedure Laterality Date   • CARDIAC ABLATION     • COLONOSCOPY N/A 2017    Mild chronic inflammation changes constistent with sprue, Hyperplastic polyp 15 cm proximal to the anus repeat exam in 10 years   • ENDOSCOPY N/A 2017    LA Grade A reflux esophagitis        Patient Active Problem List   Diagnosis   • Anemia   • Heme + stool   • Nonsmoker   • Celiac disease   • Recurrent major depressive disorder, in partial remission        Documented Vitals    17 1124  "  BP: 132/85   Weight: 143 lb (64.9 kg)   Height: 60\" (152.4 cm)   PainSc: 0-No pain       Physical Exam   Constitutional: She is oriented to person, place, and time. No distress.   Overweight white female weighing 143 pounds with BMI 27.9   HENT:   Head: Normocephalic and atraumatic.   Eyes: Conjunctivae and EOM are normal. Pupils are equal, round, and reactive to light.   Neck: Normal range of motion. Neck supple. No JVD present. No tracheal deviation present. No thyromegaly present.   Cardiovascular: Normal rate, regular rhythm, normal heart sounds and intact distal pulses.  Exam reveals no gallop and no friction rub.    No murmur heard.  Pulmonary/Chest: Effort normal and breath sounds normal. No stridor. No respiratory distress. She has no wheezes. She has no rales. She exhibits no tenderness.   Abdominal: Soft. Bowel sounds are normal. She exhibits no distension and no mass. There is no tenderness. There is no rebound and no guarding. No hernia.   Musculoskeletal: Normal range of motion. She exhibits no edema, tenderness or deformity.   Lymphadenopathy:     She has no cervical adenopathy.   Neurological: She is alert and oriented to person, place, and time. She has normal reflexes. She displays normal reflexes. No cranial nerve deficit. She exhibits normal muscle tone. Coordination normal.   Skin: Skin is warm and dry. No rash noted. She is not diaphoretic. No erythema. No pallor.   Psychiatric: She has a normal mood and affect. Her behavior is normal. Judgment and thought content normal.   Nursing note and vitals reviewed.       Assessment        Diagnosis Plan   1. Recurrent major depressive disorder, in partial remission     2. Anemia, unspecified type           Plan      1. Pristiq 50 mg per day.  2. Recommend vitamin D3 and B12 supplementation.  3. Encouraged in diet and exercise.  4. Handouts on depression, hot flashes, exercise, and vitamin use.   5. Follow-up in 1 year for annual GYN exam and Pap " smear.  Follow-up sooner as needed.            This document has been electronically signed by Trevor Salcedo MD on November 29, 2017 9:30 PM

## 2017-12-19 ENCOUNTER — OFFICE VISIT (OUTPATIENT)
Dept: GASTROENTEROLOGY | Facility: CLINIC | Age: 47
End: 2017-12-19

## 2017-12-19 VITALS
DIASTOLIC BLOOD PRESSURE: 80 MMHG | SYSTOLIC BLOOD PRESSURE: 132 MMHG | WEIGHT: 143 LBS | BODY MASS INDEX: 27 KG/M2 | OXYGEN SATURATION: 97 % | HEIGHT: 61 IN | HEART RATE: 87 BPM

## 2017-12-19 DIAGNOSIS — K90.0 CELIAC DISEASE: Primary | ICD-10-CM

## 2017-12-19 PROCEDURE — 99212 OFFICE O/P EST SF 10 MIN: CPT | Performed by: INTERNAL MEDICINE

## 2017-12-19 NOTE — PROGRESS NOTES
"[facility] Gastroenterology - Office note  12/19/2017    Deysi Valentine 1970     Chief Complaint   Patient presents with   • GI Problem     Celiac disease       HISTORY OF PRESENT ILLNESS    Deysi Valentine is a 47 y.o. female who presents For follow-up.  Celiac.  Making every attempt to be compliant with her diet.  She tells me she does get gluten it is unintentional.    Past Medical History:   Diagnosis Date   • Anemia    • Arrhythmia    • Celiac disease    • Depression    • Hx of colonic polyps    • Recurrent major depressive disorder, in partial remission 11/29/2017       Past Surgical History:   Procedure Laterality Date   • CARDIAC ABLATION     • COLONOSCOPY N/A 4/13/2017    Mild chronic inflammation changes constistent with sprue, Hyperplastic polyp 15 cm proximal to the anus repeat exam in 10 years   • ENDOSCOPY N/A 4/13/2017    LA Grade A reflux esophagitis         Current Outpatient Prescriptions:   •  desvenlafaxine (PRISTIQ) 50 MG 24 hr tablet, Take 1 tablet by mouth Daily., Disp: 90 tablet, Rfl: 4    No Known Allergies    Social History     Social History   • Marital status:      Spouse name: N/A   • Number of children: N/A   • Years of education: N/A     Occupational History   • Not on file.     Social History Main Topics   • Smoking status: Never Smoker   • Smokeless tobacco: Never Used   • Alcohol use No   • Drug use: No   • Sexual activity: Yes     Birth control/ protection: None     Other Topics Concern   • Not on file     Social History Narrative       Family History   Problem Relation Age of Onset   • Heart attack Father    • Depression Mother    • Heart attack Mother    • Colon cancer Neg Hx    • Colon polyps Neg Hx        Review of Systems   Respiratory: Negative.    Cardiovascular: Negative.    Gastrointestinal: Negative.        Vitals:    12/19/17 1508   BP: 132/80   Pulse: 87   SpO2: 97%   Weight: 64.9 kg (143 lb)   Height: 154.9 cm (61\")    Body mass index is 27.02 " kg/(m^2).    Physical Exam   Constitutional: She is oriented to person, place, and time.   HENT:   Head: Normocephalic.   Pulmonary/Chest: Effort normal.   Neurological: She is alert and oriented to person, place, and time.         Lab Results - Last 18 Months  Lab Units 09/19/17  1550 05/09/17  0836   WBC 10*3/mm3 7.70 6.50   HEMOGLOBIN g/dL 11.2* 11.4*   HEMATOCRIT % 35.4* 34.4*   MCV fL 74.8* 74.9*   PLATELETS 10*3/mm3 311 298   GLUCOSE mg/dL  --  95   BUN mg/dL  --  11   CREATININE mg/dL  --  0.59   SODIUM mmol/L  --  142   POTASSIUM mmol/L  --  4.2   CHLORIDE mmol/L  --  107   CO2 mmol/L  --  23.0*   TOTAL PROTEIN g/dL  --  7.4   ALBUMIN g/dL  --  4.10   ALT (SGPT) U/L  --  21   AST (SGOT) U/L  --  18   ALK PHOS U/L  --  100   BILIRUBIN mg/dL  --  0.4   GLOBULIN gm/dL  --  3.3         ASSESSMENT AND PLAN      1. Celiac disease  I suggested that she get some over-the-counter vitamins with iron at the local [employer] food store to avoid gluten.  I will see her back in 4 months we will recheck her CBC and her TTG at that time    EMR Dragon/transcription disclaimer: Much of this encounter note is an electronic transcription/translation of spoken language to printed text.  The electronic translation of spoken language may permit erroneous, or at times, nonsensical words or phrases to be inadvertently transcribed.  Although I have reviewed the note for such errors, some may still exist.    Yuniel Painting MD  12/19/2017  3:28 PM

## 2018-05-07 ENCOUNTER — LAB (OUTPATIENT)
Dept: LAB | Facility: HOSPITAL | Age: 48
End: 2018-05-07
Attending: INTERNAL MEDICINE

## 2018-05-07 ENCOUNTER — OFFICE VISIT (OUTPATIENT)
Dept: GASTROENTEROLOGY | Facility: CLINIC | Age: 48
End: 2018-05-07

## 2018-05-07 VITALS
HEART RATE: 125 BPM | BODY MASS INDEX: 27.94 KG/M2 | SYSTOLIC BLOOD PRESSURE: 122 MMHG | OXYGEN SATURATION: 96 % | WEIGHT: 148 LBS | DIASTOLIC BLOOD PRESSURE: 82 MMHG | HEIGHT: 61 IN

## 2018-05-07 DIAGNOSIS — K90.0 CELIAC DISEASE: Primary | ICD-10-CM

## 2018-05-07 DIAGNOSIS — K90.0 CELIAC DISEASE: ICD-10-CM

## 2018-05-07 LAB
BASOPHILS # BLD AUTO: 0.03 10*3/MM3 (ref 0–0.2)
BASOPHILS NFR BLD AUTO: 0.4 % (ref 0–2)
DEPRECATED RDW RBC AUTO: 41.2 FL (ref 40–54)
EOSINOPHIL # BLD AUTO: 0.16 10*3/MM3 (ref 0–0.7)
EOSINOPHIL NFR BLD AUTO: 2.2 % (ref 0–4)
ERYTHROCYTE [DISTWIDTH] IN BLOOD BY AUTOMATED COUNT: 14.6 % (ref 12–15)
FERRITIN SERPL-MCNC: 4.01 NG/ML (ref 6.24–137)
HCT VFR BLD AUTO: 37.4 % (ref 37–47)
HGB BLD-MCNC: 11.9 G/DL (ref 12–16)
IMM GRANULOCYTES # BLD: 0.02 10*3/MM3 (ref 0–0.03)
IMM GRANULOCYTES NFR BLD: 0.3 % (ref 0–5)
IRON 24H UR-MRATE: 26 MCG/DL (ref 42–180)
IRON SATN MFR SERPL: 6 % (ref 20–45)
LYMPHOCYTES # BLD AUTO: 1.99 10*3/MM3 (ref 0.72–4.86)
LYMPHOCYTES NFR BLD AUTO: 27.7 % (ref 15–45)
MCH RBC QN AUTO: 24.9 PG (ref 28–32)
MCHC RBC AUTO-ENTMCNC: 31.8 G/DL (ref 33–36)
MCV RBC AUTO: 78.2 FL (ref 82–98)
MONOCYTES # BLD AUTO: 0.75 10*3/MM3 (ref 0.19–1.3)
MONOCYTES NFR BLD AUTO: 10.4 % (ref 4–12)
NEUTROPHILS # BLD AUTO: 4.24 10*3/MM3 (ref 1.87–8.4)
NEUTROPHILS NFR BLD AUTO: 59 % (ref 39–78)
NRBC BLD MANUAL-RTO: 0 /100 WBC (ref 0–0)
PLATELET # BLD AUTO: 310 10*3/MM3 (ref 130–400)
PMV BLD AUTO: 9.1 FL (ref 6–12)
RBC # BLD AUTO: 4.78 10*6/MM3 (ref 4.2–5.4)
TIBC SERPL-MCNC: 465 MCG/DL (ref 225–420)
WBC NRBC COR # BLD: 7.19 10*3/MM3 (ref 4.8–10.8)

## 2018-05-07 PROCEDURE — 83516 IMMUNOASSAY NONANTIBODY: CPT | Performed by: INTERNAL MEDICINE

## 2018-05-07 PROCEDURE — 83550 IRON BINDING TEST: CPT | Performed by: INTERNAL MEDICINE

## 2018-05-07 PROCEDURE — 85025 COMPLETE CBC W/AUTO DIFF WBC: CPT | Performed by: INTERNAL MEDICINE

## 2018-05-07 PROCEDURE — 82728 ASSAY OF FERRITIN: CPT | Performed by: INTERNAL MEDICINE

## 2018-05-07 PROCEDURE — 83540 ASSAY OF IRON: CPT | Performed by: INTERNAL MEDICINE

## 2018-05-07 PROCEDURE — 99212 OFFICE O/P EST SF 10 MIN: CPT | Performed by: INTERNAL MEDICINE

## 2018-05-07 PROCEDURE — 82784 ASSAY IGA/IGD/IGG/IGM EACH: CPT | Performed by: INTERNAL MEDICINE

## 2018-05-07 PROCEDURE — 36415 COLL VENOUS BLD VENIPUNCTURE: CPT

## 2018-05-07 NOTE — PROGRESS NOTES
[facility] Gastroenterology - Office note  5/7/2018    Deysi Valentine 1970     Chief Complaint   Patient presents with   • GI Problem     Here to discuss Celiac disease       HISTORY OF PRESENT ILLNESS    Deysi Valentine is a 47 y.o. female who presents For follow-up.  Celiac disease.  Overall no complaints.  Has not seen any blood in her stools or had any change in bowel habits.  She still has a heavy menstrual cycle.  She tries to be as strict with her diet as possible.     Past Medical History:   Diagnosis Date   • Anemia    • Arrhythmia    • Celiac disease    • Depression    • Hx of colonic polyps    • Recurrent major depressive disorder, in partial remission 11/29/2017       Past Surgical History:   Procedure Laterality Date   • CARDIAC ABLATION     • COLONOSCOPY N/A 4/13/2017    Mild chronic inflammation changes constistent with sprue, Hyperplastic polyp 15 cm proximal to the anus repeat exam in 10 years   • ENDOSCOPY N/A 4/13/2017    LA Grade A reflux esophagitis         Current Outpatient Prescriptions:   •  desvenlafaxine (PRISTIQ) 50 MG 24 hr tablet, Take 1 tablet by mouth Daily., Disp: 90 tablet, Rfl: 4    No Known Allergies    Social History     Social History   • Marital status:      Spouse name: N/A   • Number of children: N/A   • Years of education: N/A     Occupational History   • Not on file.     Social History Main Topics   • Smoking status: Never Smoker   • Smokeless tobacco: Never Used   • Alcohol use No   • Drug use: No   • Sexual activity: Yes     Birth control/ protection: None     Other Topics Concern   • Not on file     Social History Narrative   • No narrative on file       Family History   Problem Relation Age of Onset   • Heart attack Father    • Depression Mother    • Heart attack Mother    • Colon cancer Neg Hx    • Colon polyps Neg Hx        Review of Systems    Vitals:    05/07/18 1515   BP: 122/82   Pulse: (!) 125   SpO2: 96%   Weight: 67.1 kg (148 lb)   Height: 154.9 cm  "(61\")    Body mass index is 27.96 kg/m².    Physical Exam      Lab Results - Last 18 Months  Lab Units 09/19/17  1550 05/09/17  0836   WBC 10*3/mm3 7.70 6.50   HEMOGLOBIN g/dL 11.2* 11.4*   HEMATOCRIT % 35.4* 34.4*   MCV fL 74.8* 74.9*   PLATELETS 10*3/mm3 311 298   GLUCOSE mg/dL  --  95   BUN mg/dL  --  11   CREATININE mg/dL  --  0.59   SODIUM mmol/L  --  142   POTASSIUM mmol/L  --  4.2   CHLORIDE mmol/L  --  107   CO2 mmol/L  --  23.0*   TOTAL PROTEIN g/dL  --  7.4   ALBUMIN g/dL  --  4.10   ALT (SGPT) U/L  --  21   AST (SGOT) U/L  --  18   ALK PHOS U/L  --  100   BILIRUBIN mg/dL  --  0.4   GLOBULIN gm/dL  --  3.3         ASSESSMENT AND PLAN      1. Celiac disease  I have asked her to get some multivitamins with iron and begin taking one to 2 daily.  I will see her back in 3 months recheck a CBC after she been on iron supplements.  Routine labs as well.  - CBC & Differential  - Ferritin  - Iron Profile  - Celiac Ab tTG DGP TIgA; Future       EMR Dragon/transcription disclaimer: Much of this encounter note is an electronic transcription/translation of spoken language to printed text.  The electronic translation of spoken language may permit erroneous, or at times, nonsensical words or phrases to be inadvertently transcribed.  Although I have reviewed the note for such errors, some may still exist.    Yuniel Painting MD  5/7/2018  3:38 PM  "

## 2018-05-09 LAB
GLIADIN PEPTIDE IGA SER-ACNC: 4 UNITS (ref 0–19)
GLIADIN PEPTIDE IGG SER-ACNC: 11 UNITS (ref 0–19)
IGA SERPL-MCNC: 107 MG/DL (ref 87–352)
TTG IGA SER-ACNC: <2 U/ML (ref 0–3)
TTG IGG SER-ACNC: 7 U/ML (ref 0–5)

## 2018-07-02 RX ORDER — FLUCONAZOLE 150 MG/1
150 TABLET ORAL DAILY
Qty: 2 TABLET | Refills: 12 | Status: SHIPPED | OUTPATIENT
Start: 2018-07-02 | End: 2018-10-29

## 2018-07-12 ENCOUNTER — TELEPHONE (OUTPATIENT)
Dept: OBSTETRICS AND GYNECOLOGY | Facility: CLINIC | Age: 48
End: 2018-07-12

## 2018-07-12 NOTE — TELEPHONE ENCOUNTER
----- Message from Natacha Thacker sent at 7/12/2018  1:14 PM CDT -----  Contact: [phone]  Adi [email or web address].

## 2018-08-20 ENCOUNTER — OFFICE VISIT (OUTPATIENT)
Dept: GASTROENTEROLOGY | Facility: CLINIC | Age: 48
End: 2018-08-20

## 2018-08-20 VITALS
HEART RATE: 100 BPM | DIASTOLIC BLOOD PRESSURE: 82 MMHG | BODY MASS INDEX: 27.56 KG/M2 | WEIGHT: 146 LBS | HEIGHT: 61 IN | SYSTOLIC BLOOD PRESSURE: 132 MMHG | OXYGEN SATURATION: 98 %

## 2018-08-20 DIAGNOSIS — K90.0 CELIAC DISEASE: Primary | ICD-10-CM

## 2018-08-20 PROCEDURE — 99213 OFFICE O/P EST LOW 20 MIN: CPT | Performed by: INTERNAL MEDICINE

## 2018-08-20 RX ORDER — LOSARTAN POTASSIUM 50 MG/1
50 TABLET ORAL DAILY
COMMUNITY
End: 2018-11-09 | Stop reason: SDUPTHER

## 2018-08-20 NOTE — PROGRESS NOTES
[facility] Gastroenterology - Office note  8/20/2018    Deysi Valentine 1970     Chief Complaint   Patient presents with   • GI Problem     Celiac Disease       HISTORY OF PRESENT ILLNESS    Deysi Valentine is a 47 y.o. female who presents for follow-up.  Celiac disease.  Anemia.  Has not been taking iron tablets as we discussed.  An upset her stomach.     Past Medical History:   Diagnosis Date   • Anemia    • Arrhythmia    • Celiac disease    • Depression    • Hx of colonic polyps    • Recurrent major depressive disorder, in partial remission (CMS/HCC) 11/29/2017       Past Surgical History:   Procedure Laterality Date   • CARDIAC ABLATION     • COLONOSCOPY N/A 4/13/2017    Mild chronic inflammation changes constistent with sprue, Hyperplastic polyp 15 cm proximal to the anus repeat exam in 10 years   • ENDOSCOPY N/A 4/13/2017    LA Grade A reflux esophagitis         Current Outpatient Prescriptions:   •  desvenlafaxine (PRISTIQ) 50 MG 24 hr tablet, Take 1 tablet by mouth Daily., Disp: 90 tablet, Rfl: 4  •  fluconazole (DIFLUCAN) 150 MG tablet, Take 1 tablet by mouth Daily. Take 1 tablet by mouth today and another tablet in 4 days, Disp: 2 tablet, Rfl: 12  •  losartan (COZAAR) 50 MG tablet, Take 50 mg by mouth Daily., Disp: , Rfl:     Allergies   Allergen Reactions   • Amoxicillin Rash       Social History     Social History   • Marital status:      Spouse name: N/A   • Number of children: N/A   • Years of education: N/A     Occupational History   • Not on file.     Social History Main Topics   • Smoking status: Never Smoker   • Smokeless tobacco: Never Used   • Alcohol use No   • Drug use: No   • Sexual activity: Yes     Birth control/ protection: None     Other Topics Concern   • Not on file     Social History Narrative   • No narrative on file       Family History   Problem Relation Age of Onset   • Heart attack Father    • Depression Mother    • Heart attack Mother    • Colon cancer Neg Hx    • Colon  "polyps Neg Hx        Review of Systems    Vitals:    08/20/18 1521   BP: 132/82   Pulse: 100   SpO2: 98%   Weight: 66.2 kg (146 lb)   Height: 154.9 cm (61\")    Body mass index is 27.59 kg/m².    Physical Exam      Lab Results - Last 18 Months  Lab Units 05/07/18  1624  05/09/17  0836   WBC 10*3/mm3 7.19  < > 6.50   HEMOGLOBIN g/dL 11.9*  < > 11.4*   HEMATOCRIT % 37.4  < > 34.4*   MCV fL 78.2*  < > 74.9*   PLATELETS 10*3/mm3 310  < > 298   GLUCOSE mg/dL  --   --  95   BUN mg/dL  --   --  11   CREATININE mg/dL  --   --  0.59   SODIUM mmol/L  --   --  142   POTASSIUM mmol/L  --   --  4.2   CHLORIDE mmol/L  --   --  107   CO2 mmol/L  --   --  23.0*   TOTAL PROTEIN g/dL  --   --  7.4   ALBUMIN g/dL  --   --  4.10   ALT (SGPT) U/L  --   --  21   AST (SGOT) U/L  --   --  18   ALK PHOS U/L  --   --  100   BILIRUBIN mg/dL  --   --  0.4   GLOBULIN gm/dL  --   --  3.3   < > = values in this interval not displayed.      ASSESSMENT AND PLAN      1. Celiac disease  Order to resume the iron taking one tablet a day.  Recheck CBC in 6 weeks.  See her back in 2 months.       EMR Dragon/transcription disclaimer: Much of this encounter note is an electronic transcription/translation of spoken language to printed text.  The electronic translation of spoken language may permit erroneous, or at times, nonsensical words or phrases to be inadvertently transcribed.  Although I have reviewed the note for such errors, some may still exist.    Yuniel Paniting MD  8/20/2018  3:43 PM  "

## 2018-10-18 ENCOUNTER — LAB (OUTPATIENT)
Dept: LAB | Facility: HOSPITAL | Age: 48
End: 2018-10-18
Attending: INTERNAL MEDICINE

## 2018-10-18 DIAGNOSIS — K90.0 CELIAC DISEASE: ICD-10-CM

## 2018-10-18 LAB
BASOPHILS # BLD AUTO: 0.04 10*3/MM3 (ref 0–0.2)
BASOPHILS NFR BLD AUTO: 0.6 % (ref 0–2)
DEPRECATED RDW RBC AUTO: 46.3 FL (ref 40–54)
EOSINOPHIL # BLD AUTO: 0.16 10*3/MM3 (ref 0–0.7)
EOSINOPHIL NFR BLD AUTO: 2.2 % (ref 0–4)
ERYTHROCYTE [DISTWIDTH] IN BLOOD BY AUTOMATED COUNT: 15.3 % (ref 12–15)
HCT VFR BLD AUTO: 39.4 % (ref 37–47)
HGB BLD-MCNC: 13.1 G/DL (ref 12–16)
IMM GRANULOCYTES # BLD: 0.02 10*3/MM3 (ref 0–0.03)
IMM GRANULOCYTES NFR BLD: 0.3 % (ref 0–5)
LYMPHOCYTES # BLD AUTO: 1.51 10*3/MM3 (ref 0.72–4.86)
LYMPHOCYTES NFR BLD AUTO: 21.2 % (ref 15–45)
MCH RBC QN AUTO: 27.6 PG (ref 28–32)
MCHC RBC AUTO-ENTMCNC: 33.2 G/DL (ref 33–36)
MCV RBC AUTO: 82.9 FL (ref 82–98)
MONOCYTES # BLD AUTO: 0.71 10*3/MM3 (ref 0.19–1.3)
MONOCYTES NFR BLD AUTO: 10 % (ref 4–12)
NEUTROPHILS # BLD AUTO: 4.69 10*3/MM3 (ref 1.87–8.4)
NEUTROPHILS NFR BLD AUTO: 65.7 % (ref 39–78)
NRBC BLD MANUAL-RTO: 0 /100 WBC (ref 0–0)
PLATELET # BLD AUTO: 293 10*3/MM3 (ref 130–400)
PMV BLD AUTO: 8.7 FL (ref 6–12)
RBC # BLD AUTO: 4.75 10*6/MM3 (ref 4.2–5.4)
WBC NRBC COR # BLD: 7.13 10*3/MM3 (ref 4.8–10.8)

## 2018-10-18 PROCEDURE — 36415 COLL VENOUS BLD VENIPUNCTURE: CPT

## 2018-10-18 PROCEDURE — 85025 COMPLETE CBC W/AUTO DIFF WBC: CPT | Performed by: INTERNAL MEDICINE

## 2018-10-29 ENCOUNTER — OFFICE VISIT (OUTPATIENT)
Dept: GASTROENTEROLOGY | Facility: CLINIC | Age: 48
End: 2018-10-29

## 2018-10-29 VITALS
DIASTOLIC BLOOD PRESSURE: 84 MMHG | HEART RATE: 76 BPM | OXYGEN SATURATION: 99 % | SYSTOLIC BLOOD PRESSURE: 132 MMHG | BODY MASS INDEX: 28.13 KG/M2 | HEIGHT: 61 IN | WEIGHT: 149 LBS

## 2018-10-29 DIAGNOSIS — K90.0 CELIAC DISEASE: Primary | ICD-10-CM

## 2018-10-29 PROCEDURE — 99212 OFFICE O/P EST SF 10 MIN: CPT | Performed by: INTERNAL MEDICINE

## 2018-10-29 NOTE — PROGRESS NOTES
[facility] Gastroenterology - Office note  10/29/2018    Deysi Valentine 1970     Chief Complaint   Patient presents with   • Celiac Disease       HISTORY OF PRESENT ILLNESS    Deysi Valentine is a 48 y.o. female who presents for follow-up of celiac disease.  He is taking her oral iron once a day.  Her hemoglobin is now up over 13.    Past Medical History:   Diagnosis Date   • Anemia    • Arrhythmia    • Celiac disease    • Depression    • Hx of colonic polyps    • Recurrent major depressive disorder, in partial remission (CMS/HCC) 11/29/2017       Past Surgical History:   Procedure Laterality Date   • CARDIAC ABLATION     • COLONOSCOPY N/A 4/13/2017    Mild chronic inflammation changes constistent with sprue, Hyperplastic polyp 15 cm proximal to the anus repeat exam in 10 years   • ENDOSCOPY N/A 4/13/2017    LA Grade A reflux esophagitis         Current Outpatient Prescriptions:   •  desvenlafaxine (PRISTIQ) 50 MG 24 hr tablet, Take 1 tablet by mouth Daily., Disp: 90 tablet, Rfl: 4  •  losartan (COZAAR) 50 MG tablet, Take 50 mg by mouth Daily., Disp: , Rfl:     Allergies   Allergen Reactions   • Amoxicillin Rash       Social History     Social History   • Marital status:      Spouse name: N/A   • Number of children: N/A   • Years of education: N/A     Occupational History   • Not on file.     Social History Main Topics   • Smoking status: Never Smoker   • Smokeless tobacco: Never Used   • Alcohol use No   • Drug use: No   • Sexual activity: Yes     Birth control/ protection: None     Other Topics Concern   • Not on file     Social History Narrative   • No narrative on file       Family History   Problem Relation Age of Onset   • Heart attack Father    • Depression Mother    • Heart attack Mother    • Colon cancer Neg Hx    • Colon polyps Neg Hx        Review of Systems   Respiratory: Negative.    Cardiovascular: Negative.    Gastrointestinal: Negative.        Vitals:    10/29/18 1432   BP: 132/84   Pulse:  "76   SpO2: 99%   Weight: 67.6 kg (149 lb)   Height: 154.9 cm (61\")    Body mass index is 28.15 kg/m².    Physical Exam   Constitutional: She is oriented to person, place, and time. She appears well-developed.   Pulmonary/Chest: Effort normal.   Neurological: She is alert and oriented to person, place, and time.         Lab Results - Last 18 Months  Lab Units 10/18/18  0707  05/09/17  0836   WBC 10*3/mm3 7.13  < > 6.50   HEMOGLOBIN g/dL 13.1  < > 11.4*   HEMATOCRIT % 39.4  < > 34.4*   MCV fL 82.9  < > 74.9*   PLATELETS 10*3/mm3 293  < > 298   GLUCOSE mg/dL  --   --  95   BUN mg/dL  --   --  11   CREATININE mg/dL  --   --  0.59   SODIUM mmol/L  --   --  142   POTASSIUM mmol/L  --   --  4.2   CHLORIDE mmol/L  --   --  107   CO2 mmol/L  --   --  23.0*   TOTAL PROTEIN g/dL  --   --  7.4   ALBUMIN g/dL  --   --  4.10   ALT (SGPT) U/L  --   --  21   AST (SGOT) U/L  --   --  18   ALK PHOS U/L  --   --  100   BILIRUBIN mg/dL  --   --  0.4   GLOBULIN gm/dL  --   --  3.3   < > = values in this interval not displayed.      ASSESSMENT AND PLAN      1. Celiac disease  Check a CBC in 6 months.  Continue iron for a couple more months and we will see if she maintains her hemoglobin and hematocrit.  Continue gluten-free diet       EMR Dragon/transcription disclaimer: Much of this encounter note is an electronic transcription/translation of spoken language to printed text.  The electronic translation of spoken language may permit erroneous, or at times, nonsensical words or phrases to be inadvertently transcribed.  Although I have reviewed the note for such errors, some may still exist.    Yuniel Painting MD  10/29/2018  2:50 PM  "

## 2018-11-09 ENCOUNTER — OFFICE VISIT (OUTPATIENT)
Dept: INTERNAL MEDICINE | Facility: CLINIC | Age: 48
End: 2018-11-09

## 2018-11-09 VITALS
WEIGHT: 146.19 LBS | BODY MASS INDEX: 27.6 KG/M2 | SYSTOLIC BLOOD PRESSURE: 122 MMHG | HEIGHT: 61 IN | RESPIRATION RATE: 12 BRPM | TEMPERATURE: 98.3 F | DIASTOLIC BLOOD PRESSURE: 94 MMHG | OXYGEN SATURATION: 98 % | HEART RATE: 88 BPM

## 2018-11-09 DIAGNOSIS — M99.00 SOMATIC DYSFUNCTION OF HEAD REGION: ICD-10-CM

## 2018-11-09 DIAGNOSIS — M99.06 SOMATIC DYSFUNCTION OF LOWER EXTREMITY: ICD-10-CM

## 2018-11-09 DIAGNOSIS — I10 HYPERTENSION, UNSPECIFIED TYPE: Primary | ICD-10-CM

## 2018-11-09 DIAGNOSIS — M99.03 SOMATIC DYSFUNCTION OF SPINE, LUMBAR: ICD-10-CM

## 2018-11-09 DIAGNOSIS — M99.02 SOMATIC DYSFUNCTION OF SPINE, THORACIC: ICD-10-CM

## 2018-11-09 DIAGNOSIS — M99.09 SEGMENTAL AND SOMATIC DYSFUNCTION OF ABDOMEN AND OTHER REGIONS: ICD-10-CM

## 2018-11-09 DIAGNOSIS — M99.08 SEGMENTAL AND SOMATIC DYSFUNCTION OF RIB CAGE: ICD-10-CM

## 2018-11-09 PROCEDURE — 99204 OFFICE O/P NEW MOD 45 MIN: CPT | Performed by: FAMILY MEDICINE

## 2018-11-09 PROCEDURE — 98927 OSTEOPATH MANJ 5-6 REGIONS: CPT | Performed by: FAMILY MEDICINE

## 2018-11-09 RX ORDER — LOSARTAN POTASSIUM 50 MG/1
50 TABLET ORAL DAILY
Qty: 30 TABLET | Refills: 1 | Status: SHIPPED | OUTPATIENT
Start: 2018-11-09 | End: 2019-02-06 | Stop reason: ALTCHOICE

## 2018-11-09 NOTE — ASSESSMENT & PLAN NOTE
New onset with diastolic hypertension in our office, with multiple somatic dysfunctions limiting respiratory and lymphatic compliance/drainage.  Additional segmental dysfunctions of thoracic spine and head potentially affecting autonomic balance  -OMT to balance autonomic tone and improve compliance of lymphatics and circulation.  Modest response today  -Patient will take 1 week trial off of losartan with home BP measurements, if elevated will resume, and if titration needed can increase to 100 mg daily  -BMP 7-10 days following losartan  -Follow 4 weeks

## 2018-11-09 NOTE — PATIENT INSTRUCTIONS
"· Stop losartan 3 days after treatment today  · Add it back if /90 or greater most days of the week  · OK to increase losartan to 100 mg daily if not controlled in 1 week  · Get lab 1-2 weeks after starting losartan if needed  · See us back in 1 month to see how you're doing  · More exercise, less salt :)      DASH Eating Plan  DASH stands for \"Dietary Approaches to Stop Hypertension.\" The DASH eating plan is a healthy eating plan that has been shown to reduce high blood pressure (hypertension). It may also reduce your risk for type 2 diabetes, heart disease, and stroke. The DASH eating plan may also help with weight loss.  What are tips for following this plan?  General guidelines  · Avoid eating more than 2,300 mg (milligrams) of salt (sodium) a day. If you have hypertension, you may need to reduce your sodium intake to 1,500 mg a day.  · Limit alcohol intake to no more than 1 drink a day for nonpregnant women and 2 drinks a day for men. One drink equals 12 oz of beer, 5 oz of wine, or 1½ oz of hard liquor.  · Work with your health care provider to maintain a healthy body weight or to lose weight. Ask what an ideal weight is for you.  · Get at least 30 minutes of exercise that causes your heart to beat faster (aerobic exercise) most days of the week. Activities may include walking, swimming, or biking.  · Work with your health care provider or diet and nutrition specialist (dietitian) to adjust your eating plan to your individual calorie needs.  Reading food labels  · Check food labels for the amount of sodium per serving. Choose foods with less than 5 percent of the Daily Value of sodium. Generally, foods with less than 300 mg of sodium per serving fit into this eating plan.  · To find whole grains, look for the word \"whole\" as the first word in the ingredient list.  Shopping  · Buy products labeled as \"low-sodium\" or \"no salt added.\"  · Buy fresh foods. Avoid canned foods and premade or frozen " meals.  Cooking  · Avoid adding salt when cooking. Use salt-free seasonings or herbs instead of table salt or sea salt. Check with your health care provider or pharmacist before using salt substitutes.  · Do not rooney foods. Cook foods using healthy methods such as baking, boiling, grilling, and broiling instead.  · Cook with heart-healthy oils, such as olive, canola, soybean, or sunflower oil.  Meal planning    · Eat a balanced diet that includes:  ? 5 or more servings of fruits and vegetables each day. At each meal, try to fill half of your plate with fruits and vegetables.  ? Up to 6-8 servings of whole grains each day.  ? Less than 6 oz of lean meat, poultry, or fish each day. A 3-oz serving of meat is about the same size as a deck of cards. One egg equals 1 oz.  ? 2 servings of low-fat dairy each day.  ? A serving of nuts, seeds, or beans 5 times each week.  ? Heart-healthy fats. Healthy fats called Omega-3 fatty acids are found in foods such as flaxseeds and coldwater fish, like sardines, salmon, and mackerel.  · Limit how much you eat of the following:  ? Canned or prepackaged foods.  ? Food that is high in trans fat, such as fried foods.  ? Food that is high in saturated fat, such as fatty meat.  ? Sweets, desserts, sugary drinks, and other foods with added sugar.  ? Full-fat dairy products.  · Do not salt foods before eating.  · Try to eat at least 2 vegetarian meals each week.  · Eat more home-cooked food and less restaurant, buffet, and fast food.  · When eating at a restaurant, ask that your food be prepared with less salt or no salt, if possible.  What foods are recommended?  The items listed may not be a complete list. Talk with your dietitian about what dietary choices are best for you.  Grains  Whole-grain or whole-wheat bread. Whole-grain or whole-wheat pasta. Brown rice. Oatmeal. Quinoa. Bulgur. Whole-grain and low-sodium cereals. Mercedez bread. Low-fat, low-sodium crackers. Whole-wheat flour  tortillas.  Vegetables  Fresh or frozen vegetables (raw, steamed, roasted, or grilled). Low-sodium or reduced-sodium tomato and vegetable juice. Low-sodium or reduced-sodium tomato sauce and tomato paste. Low-sodium or reduced-sodium canned vegetables.  Fruits  All fresh, dried, or frozen fruit. Canned fruit in natural juice (without added sugar).  Meat and other protein foods  Skinless chicken or turkey. Ground chicken or turkey. Pork with fat trimmed off. Fish and seafood. Egg whites. Dried beans, peas, or lentils. Unsalted nuts, nut butters, and seeds. Unsalted canned beans. Lean cuts of beef with fat trimmed off. Low-sodium, lean deli meat.  Dairy  Low-fat (1%) or fat-free (skim) milk. Fat-free, low-fat, or reduced-fat cheeses. Nonfat, low-sodium ricotta or cottage cheese. Low-fat or nonfat yogurt. Low-fat, low-sodium cheese.  Fats and oils  Soft margarine without trans fats. Vegetable oil. Low-fat, reduced-fat, or light mayonnaise and salad dressings (reduced-sodium). Canola, safflower, olive, soybean, and sunflower oils. Avocado.  Seasoning and other foods  Herbs. Spices. Seasoning mixes without salt. Unsalted popcorn and pretzels. Fat-free sweets.  What foods are not recommended?  The items listed may not be a complete list. Talk with your dietitian about what dietary choices are best for you.  Grains  Baked goods made with fat, such as croissants, muffins, or some breads. Dry pasta or rice meal packs.  Vegetables  Creamed or fried vegetables. Vegetables in a cheese sauce. Regular canned vegetables (not low-sodium or reduced-sodium). Regular canned tomato sauce and paste (not low-sodium or reduced-sodium). Regular tomato and vegetable juice (not low-sodium or reduced-sodium). Pickles. Olives.  Fruits  Canned fruit in a light or heavy syrup. Fried fruit. Fruit in cream or butter sauce.  Meat and other protein foods  Fatty cuts of meat. Ribs. Fried meat. Kirkpatrick. Sausage. Bologna and other processed lunch meats.  Salami. Fatback. Hotdogs. Bratwurst. Salted nuts and seeds. Canned beans with added salt. Canned or smoked fish. Whole eggs or egg yolks. Chicken or turkey with skin.  Dairy  Whole or 2% milk, cream, and half-and-half. Whole or full-fat cream cheese. Whole-fat or sweetened yogurt. Full-fat cheese. Nondairy creamers. Whipped toppings. Processed cheese and cheese spreads.  Fats and oils  Butter. Stick margarine. Lard. Shortening. Ghee. Kirkpatrick fat. Tropical oils, such as coconut, palm kernel, or palm oil.  Seasoning and other foods  Salted popcorn and pretzels. Onion salt, garlic salt, seasoned salt, table salt, and sea salt. Worcestershire sauce. Tartar sauce. Barbecue sauce. Teriyaki sauce. Soy sauce, including reduced-sodium. Steak sauce. Canned and packaged gravies. Fish sauce. Oyster sauce. Cocktail sauce. Horseradish that you find on the shelf. Ketchup. Mustard. Meat flavorings and tenderizers. Bouillon cubes. Hot sauce and Tabasco sauce. Premade or packaged marinades. Premade or packaged taco seasonings. Relishes. Regular salad dressings.  Where to find more information:  · National Heart, Lung, and Blood [place]: www.nhlbi.nih.gov  · American Heart Association: www.heart.org  Summary  · The DASH eating plan is a healthy eating plan that has been shown to reduce high blood pressure (hypertension). It may also reduce your risk for type 2 diabetes, heart disease, and stroke.  · With the DASH eating plan, you should limit salt (sodium) intake to 2,300 mg a day. If you have hypertension, you may need to reduce your sodium intake to 1,500 mg a day.  · When on the DASH eating plan, aim to eat more fresh fruits and vegetables, whole grains, lean proteins, low-fat dairy, and heart-healthy fats.  · Work with your health care provider or diet and nutrition specialist (dietitian) to adjust your eating plan to your individual calorie needs.  This information is not intended to replace advice given to you by your health  care provider. Make sure you discuss any questions you have with your health care provider.  Document Released: 12/06/2012 Document Revised: 12/11/2017 Document Reviewed: 12/11/2017  Elsevier Interactive Patient Education © 2018 [employer]

## 2018-12-05 ENCOUNTER — TELEPHONE (OUTPATIENT)
Dept: OBSTETRICS AND GYNECOLOGY | Facility: CLINIC | Age: 48
End: 2018-12-05

## 2018-12-05 NOTE — TELEPHONE ENCOUNTER
----- Message from Caryn Michelle sent at 12/5/2018  2:38 PM CST -----  Contact: [phone]  PATIENT IS WANTING TO SOMEONE TO CALLING ABOUT HER PERIOD ISSUES     The pt called c/o irregular, long, heavy periods and requested to see Dr. Salcedo.  I scheduled her for 12/12/18 @ 1:30 pm.

## 2018-12-06 ENCOUNTER — TELEPHONE (OUTPATIENT)
Dept: INTERNAL MEDICINE | Facility: CLINIC | Age: 48
End: 2018-12-06

## 2018-12-06 ENCOUNTER — LAB (OUTPATIENT)
Dept: LAB | Facility: HOSPITAL | Age: 48
End: 2018-12-06
Attending: FAMILY MEDICINE

## 2018-12-06 DIAGNOSIS — I10 HYPERTENSION, UNSPECIFIED TYPE: ICD-10-CM

## 2018-12-06 LAB
ANION GAP SERPL CALCULATED.3IONS-SCNC: 11 MMOL/L (ref 4–13)
BUN BLD-MCNC: 8 MG/DL (ref 5–21)
BUN/CREAT SERPL: 11 (ref 7–25)
CALCIUM SPEC-SCNC: 8.8 MG/DL (ref 8.4–10.4)
CHLORIDE SERPL-SCNC: 104 MMOL/L (ref 98–110)
CO2 SERPL-SCNC: 26 MMOL/L (ref 24–31)
CREAT BLD-MCNC: 0.73 MG/DL (ref 0.5–1.4)
GFR SERPL CREATININE-BSD FRML MDRD: 85 ML/MIN/1.73
GLUCOSE BLD-MCNC: 98 MG/DL (ref 70–100)
POTASSIUM BLD-SCNC: 4 MMOL/L (ref 3.5–5.3)
SODIUM BLD-SCNC: 141 MMOL/L (ref 135–145)

## 2018-12-06 PROCEDURE — 36415 COLL VENOUS BLD VENIPUNCTURE: CPT

## 2018-12-06 PROCEDURE — 80048 BASIC METABOLIC PNL TOTAL CA: CPT | Performed by: FAMILY MEDICINE

## 2018-12-12 ENCOUNTER — LAB (OUTPATIENT)
Dept: LAB | Facility: HOSPITAL | Age: 48
End: 2018-12-12

## 2018-12-12 ENCOUNTER — OFFICE VISIT (OUTPATIENT)
Dept: OBSTETRICS AND GYNECOLOGY | Facility: CLINIC | Age: 48
End: 2018-12-12

## 2018-12-12 VITALS
HEART RATE: 95 BPM | WEIGHT: 146.8 LBS | BODY MASS INDEX: 27.72 KG/M2 | HEIGHT: 61 IN | DIASTOLIC BLOOD PRESSURE: 80 MMHG | SYSTOLIC BLOOD PRESSURE: 138 MMHG

## 2018-12-12 DIAGNOSIS — Z01.419 ENCOUNTER FOR ANNUAL ROUTINE GYNECOLOGICAL EXAMINATION: Primary | ICD-10-CM

## 2018-12-12 DIAGNOSIS — N93.9 ABNORMAL UTERINE BLEEDING: ICD-10-CM

## 2018-12-12 DIAGNOSIS — Z12.31 ENCOUNTER FOR SCREENING MAMMOGRAM FOR BREAST CANCER: ICD-10-CM

## 2018-12-12 DIAGNOSIS — K90.0 CELIAC DISEASE: ICD-10-CM

## 2018-12-12 DIAGNOSIS — E66.3 OVERWEIGHT (BMI 25.0-29.9): ICD-10-CM

## 2018-12-12 DIAGNOSIS — R53.83 LETHARGY: ICD-10-CM

## 2018-12-12 DIAGNOSIS — F33.41 RECURRENT MAJOR DEPRESSIVE DISORDER, IN PARTIAL REMISSION (HCC): Chronic | ICD-10-CM

## 2018-12-12 PROCEDURE — 82306 VITAMIN D 25 HYDROXY: CPT | Performed by: OBSTETRICS & GYNECOLOGY

## 2018-12-12 PROCEDURE — 83001 ASSAY OF GONADOTROPIN (FSH): CPT | Performed by: OBSTETRICS & GYNECOLOGY

## 2018-12-12 PROCEDURE — 85025 COMPLETE CBC W/AUTO DIFF WBC: CPT

## 2018-12-12 PROCEDURE — 82607 VITAMIN B-12: CPT | Performed by: OBSTETRICS & GYNECOLOGY

## 2018-12-12 PROCEDURE — 83002 ASSAY OF GONADOTROPIN (LH): CPT | Performed by: OBSTETRICS & GYNECOLOGY

## 2018-12-12 PROCEDURE — 82670 ASSAY OF TOTAL ESTRADIOL: CPT | Performed by: OBSTETRICS & GYNECOLOGY

## 2018-12-12 PROCEDURE — 82746 ASSAY OF FOLIC ACID SERUM: CPT | Performed by: OBSTETRICS & GYNECOLOGY

## 2018-12-12 PROCEDURE — 80053 COMPREHEN METABOLIC PANEL: CPT | Performed by: OBSTETRICS & GYNECOLOGY

## 2018-12-12 PROCEDURE — G0123 SCREEN CERV/VAG THIN LAYER: HCPCS | Performed by: OBSTETRICS & GYNECOLOGY

## 2018-12-12 PROCEDURE — 87624 HPV HI-RISK TYP POOLED RSLT: CPT | Performed by: OBSTETRICS & GYNECOLOGY

## 2018-12-12 PROCEDURE — 84443 ASSAY THYROID STIM HORMONE: CPT | Performed by: OBSTETRICS & GYNECOLOGY

## 2018-12-12 PROCEDURE — 99396 PREV VISIT EST AGE 40-64: CPT | Performed by: OBSTETRICS & GYNECOLOGY

## 2018-12-12 RX ORDER — DESVENLAFAXINE SUCCINATE 50 MG/1
50 TABLET, EXTENDED RELEASE ORAL DAILY
Qty: 90 TABLET | Refills: 4 | Status: SHIPPED | OUTPATIENT
Start: 2018-12-12 | End: 2020-01-28 | Stop reason: SDUPTHER

## 2018-12-12 NOTE — PROGRESS NOTES
Deysi Valentine is a 48 y.o. y/o female     Chief Complaint: Abnormal uterine bleeding, manage depression and other gynecologic issues    HPI: 48-year-old  with three prior vaginal deliveries.  Her  has a vasectomy.      Deysi's  is a [employer]  in [place].    She is a pharmacy tech.    She has a nonsmoker.    She has a history of cardiac supraventricular tachycardia, but she is not having any problems with this since ablation therapy in 2008.    She has recently been diagnosed with celiac disease.  She manages this well with a gluten-free diet.    She has had some problems with anemia.    She has had some fairly significant issues with depression and finds that Pristiq 50 mg per day works well for her.    Last menstrual period 2017.  Her periods last about 4 days.  They're not particularly heavy.  She has no intermenstrual bleeding.  Her periods are regular.    They have 3 boys.  As of 2018:  Sagar is attending law school at .  Jose is a Freshman at   Dakota is 12 years old and in the seventh grade.    Her family history is significant for mother  at age 65.  Father  at age 84.  She has a sister who  at age 61.    2000 1846.8 pounds with BMI 27.7.  Blood pressure 130/80.  Pulse 95.  Over the past year, Deysi had some problems with hypotension, so she stopped taking the Cozaar 50 mg per day.  Her blood pressures a little up today.  She is having some problems with heavier and more irregular bleeding.  Her Pap smear was performed today.  It feels like her uterus is somewhat enlarged.  We will check blood work today and get her scheduled for mammogram and pelvic ultrasound.  I refilled her Pristiq.  I gave her samples of Lo Loestrin OCPs.  I will see her back in 6-8 weeks.  Follow-up sooner as needed.    Review of Systems   Constitutional: Positive for fatigue. Negative for activity change, appetite change, chills, diaphoresis,  fever and unexpected weight change.   Gastrointestinal: Negative for abdominal pain, constipation, diarrhea and nausea.   Genitourinary: Positive for menstrual problem. Negative for difficulty urinating, dyspareunia, dysuria, pelvic pain, urgency, vaginal bleeding, vaginal discharge and vaginal pain.   Neurological: Negative for headaches.   Psychiatric/Behavioral: Positive for dysphoric mood. The patient is nervous/anxious.    All other systems reviewed and are negative.     Breast ROS: negative    The following portions of the patient's history were reviewed and updated as appropriate: allergies, current medications, past family history, past medical history, past social history, past surgical history and problem list.    Allergies   Allergen Reactions   • Amoxicillin Rash   • Penicillins Rash          Current Outpatient Medications:   •  desvenlafaxine (PRISTIQ) 50 MG 24 hr tablet, Take 1 tablet by mouth Daily., Disp: 90 tablet, Rfl: 4  •  losartan (COZAAR) 50 MG tablet, Take 1 tablet by mouth Daily., Disp: 30 tablet, Rfl: 1  •  NON FORMULARY, Take 1 tablet by mouth Daily. Integrative Gluten free multivitamin with iron, Disp: , Rfl:      The patient has a family history of   Family History   Problem Relation Age of Onset   • Heart attack Father    • Depression Mother    • Heart attack Mother    • Colon cancer Neg Hx    • Colon polyps Neg Hx         Past Medical History:   Diagnosis Date   • Anemia    • Anxiety    • Arrhythmia    • Celiac disease    • Celiac disease    • Depression    • Hx of colonic polyps    • Hypertension    • Recurrent major depressive disorder, in partial remission (CMS/[facility]) 2017        OB History      Para Term  AB Living    3 3       3    SAB TAB Ectopic Molar Multiple Live Births                          Social History     Socioeconomic History   • Marital status:      Spouse name: Not on file   • Number of children: Not on file   • Years of education: Not on  "file   • Highest education level: Not on file   Social Needs   • Financial resource strain: Not on file   • Food insecurity - worry: Not on file   • Food insecurity - inability: Not on file   • Transportation needs - medical: Not on file   • Transportation needs - non-medical: Not on file   Occupational History   • Not on file   Tobacco Use   • Smoking status: Never Smoker   • Smokeless tobacco: Never Used   Substance and Sexual Activity   • Alcohol use: No   • Drug use: No   • Sexual activity: Yes     Partners: Male     Birth control/protection: None   Other Topics Concern   • Not on file   Social History Narrative   • Not on file        Past Surgical History:   Procedure Laterality Date   • CARDIAC ABLATION          Patient Active Problem List   Diagnosis   • Anemia   • Heme + stool   • Nonsmoker   • Celiac disease   • Recurrent major depressive disorder, in partial remission (CMS/HCC)   • Hypertension   • Overweight (BMI 25.0-29.9)        Documented Vitals    12/12/18 1305   BP: 138/80   Pulse: 95   Weight: 66.6 kg (146 lb 12.8 oz)   Height: 154.9 cm (61\")       Physical Exam   Constitutional: She is oriented to person, place, and time. No distress.   Overweight white female weighing 146.8 pounds with BMI 27.7  Blood pressure 138/80.  Pulse 95.   HENT:   Head: Normocephalic and atraumatic.   Eyes: Conjunctivae and EOM are normal. Pupils are equal, round, and reactive to light.   Neck: Normal range of motion. Neck supple. No JVD present. No tracheal deviation present. No thyromegaly present.   Cardiovascular: Normal rate, regular rhythm, normal heart sounds and intact distal pulses. Exam reveals no gallop and no friction rub.   No murmur heard.  Pulmonary/Chest: Effort normal and breath sounds normal. No stridor. No respiratory distress. She has no wheezes. She has no rales. She exhibits no tenderness.   Abdominal: Soft. Bowel sounds are normal. She exhibits no distension and no mass. There is no tenderness. There " is no rebound and no guarding. No hernia. Hernia confirmed negative in the right inguinal area and confirmed negative in the left inguinal area.   Genitourinary: Rectal exam shows no external hemorrhoid, no internal hemorrhoid, no fissure, no mass, no tenderness, anal tone normal and guaiac negative stool. No labial fusion. There is no rash, tenderness, lesion or injury on the right labia. There is no rash, tenderness, lesion or injury on the left labia. Uterus is enlarged and tender. Cervix exhibits no motion tenderness, no discharge and no friability. Right adnexum displays no mass, no tenderness and no fullness. Left adnexum displays no mass, no tenderness and no fullness. No erythema, tenderness or bleeding in the vagina. No foreign body in the vagina. No signs of injury around the vagina. No vaginal discharge found.   Genitourinary Comments: Pap smear performed.  Uterus may be somewhat enlarged.   Musculoskeletal: Normal range of motion. She exhibits no edema, tenderness or deformity.   Lymphadenopathy:     She has no cervical adenopathy. No inguinal adenopathy noted on the right or left side.   Neurological: She is alert and oriented to person, place, and time. She has normal reflexes. She displays normal reflexes. No cranial nerve deficit. She exhibits normal muscle tone. Coordination normal.   Skin: Skin is warm and dry. No rash noted. She is not diaphoretic. No erythema. No pallor.   Psychiatric: She has a normal mood and affect. Her behavior is normal. Judgment and thought content normal.   Nursing note and vitals reviewed.       Assessment        Diagnosis Plan   1. Encounter for annual routine gynecological examination  Liquid-based Pap Smear, Screening    Comprehensive Metabolic Panel    TSH    Vitamin B12    Vitamin D 25 Hydroxy    Folate    Estradiol    Follicle Stimulating Hormone    Luteinizing Hormone   2. Recurrent major depressive disorder, in partial remission (CMS/HCC)  Comprehensive Metabolic  Panel    TSH    Vitamin B12    Vitamin D 25 Hydroxy    Folate    Estradiol    Follicle Stimulating Hormone    Luteinizing Hormone   3. Abnormal uterine bleeding  US Non-ob Transvaginal    Comprehensive Metabolic Panel    TSH    Vitamin B12    Vitamin D 25 Hydroxy    Folate    Estradiol    Follicle Stimulating Hormone    Luteinizing Hormone   4. Lethargy  Comprehensive Metabolic Panel    TSH    Vitamin B12    Vitamin D 25 Hydroxy    Folate    Estradiol    Follicle Stimulating Hormone    Luteinizing Hormone   5. Encounter for screening mammogram for breast cancer  Mammo Screening Digital Tomosynthesis Bilateral With CAD    Comprehensive Metabolic Panel    TSH    Vitamin B12    Vitamin D 25 Hydroxy    Folate    Estradiol    Follicle Stimulating Hormone    Luteinizing Hormone   6. Overweight (BMI 25.0-29.9)  Comprehensive Metabolic Panel    TSH    Vitamin B12    Vitamin D 25 Hydroxy    Folate    Estradiol    Follicle Stimulating Hormone    Luteinizing Hormone         Plan      1. Check blood work today.  2. Schedule mammogram and pelvic ultrasound in [place].  3. Lo Loestrin OCPs.  4. Continue Pristiq 50 mg per day.  5. Recommend vitamin D3 and B12 supplementation.  6. Encouraged in diet and exercise.  7. Handouts on depression, hot flashes, exercise, and vitamin use.   8. Follow-up in 6-8 weeks.  Follow-up sooner as needed.            This document has been electronically signed by Trevor Salcedo MD on December 12, 2018 2:07 PM

## 2018-12-13 ENCOUNTER — LAB (OUTPATIENT)
Dept: LAB | Facility: HOSPITAL | Age: 48
End: 2018-12-13
Attending: FAMILY MEDICINE

## 2018-12-13 ENCOUNTER — OFFICE VISIT (OUTPATIENT)
Dept: INTERNAL MEDICINE | Facility: CLINIC | Age: 48
End: 2018-12-13

## 2018-12-13 ENCOUNTER — TELEPHONE (OUTPATIENT)
Dept: INTERNAL MEDICINE | Facility: CLINIC | Age: 48
End: 2018-12-13

## 2018-12-13 VITALS
DIASTOLIC BLOOD PRESSURE: 94 MMHG | BODY MASS INDEX: 28.05 KG/M2 | TEMPERATURE: 98.8 F | OXYGEN SATURATION: 98 % | HEART RATE: 88 BPM | WEIGHT: 148.56 LBS | RESPIRATION RATE: 12 BRPM | HEIGHT: 61 IN | SYSTOLIC BLOOD PRESSURE: 126 MMHG

## 2018-12-13 DIAGNOSIS — Z13.220 SCREENING FOR LIPID DISORDERS: ICD-10-CM

## 2018-12-13 DIAGNOSIS — I10 ESSENTIAL HYPERTENSION: Primary | ICD-10-CM

## 2018-12-13 LAB
ALBUMIN SERPL-MCNC: 4.3 G/DL (ref 3.4–4.8)
ALBUMIN/GLOB SERPL: 1.8 G/DL (ref 1.1–1.8)
ALP SERPL-CCNC: 76 U/L (ref 38–126)
ALT SERPL W P-5'-P-CCNC: 22 U/L (ref 9–52)
ANION GAP SERPL CALCULATED.3IONS-SCNC: 10 MMOL/L (ref 5–15)
ARTICHOKE IGE QN: 135 MG/DL (ref 0–99)
AST SERPL-CCNC: 19 U/L (ref 14–36)
BASOPHILS # BLD AUTO: 0.02 10*3/MM3 (ref 0–0.2)
BASOPHILS NFR BLD AUTO: 0.3 % (ref 0–2)
BILIRUB SERPL-MCNC: 0.2 MG/DL (ref 0.2–1.3)
BUN BLD-MCNC: 8 MG/DL (ref 7–21)
BUN/CREAT SERPL: 11 (ref 7–25)
CALCIUM SPEC-SCNC: 9.3 MG/DL (ref 8.4–10.2)
CHLORIDE SERPL-SCNC: 102 MMOL/L (ref 95–110)
CHOLEST SERPL-MCNC: 177 MG/DL (ref 130–200)
CO2 SERPL-SCNC: 26 MMOL/L (ref 22–31)
CREAT BLD-MCNC: 0.73 MG/DL (ref 0.5–1)
DEPRECATED RDW RBC AUTO: 41.3 FL (ref 36.4–46.3)
EOSINOPHIL # BLD AUTO: 0.15 10*3/MM3 (ref 0–0.7)
EOSINOPHIL NFR BLD AUTO: 2.3 % (ref 0–7)
ERYTHROCYTE [DISTWIDTH] IN BLOOD BY AUTOMATED COUNT: 13.4 % (ref 11.5–14.5)
GFR SERPL CREATININE-BSD FRML MDRD: 85 ML/MIN/1.73 (ref 58–135)
GLOBULIN UR ELPH-MCNC: 2.4 GM/DL (ref 2.3–3.5)
GLUCOSE BLD-MCNC: 99 MG/DL (ref 60–100)
HCT VFR BLD AUTO: 39 % (ref 35–45)
HDLC SERPL-MCNC: 34 MG/DL
HGB BLD-MCNC: 13.2 G/DL (ref 12–15.5)
IMM GRANULOCYTES # BLD: 0.02 10*3/MM3 (ref 0–0.02)
IMM GRANULOCYTES NFR BLD: 0.3 % (ref 0–0.5)
LDLC/HDLC SERPL: 3.51 {RATIO}
LYMPHOCYTES # BLD AUTO: 1.8 10*3/MM3 (ref 0.6–4.2)
LYMPHOCYTES NFR BLD AUTO: 27.4 % (ref 10–50)
MCH RBC QN AUTO: 28.8 PG (ref 26.5–34)
MCHC RBC AUTO-ENTMCNC: 33.8 G/DL (ref 31.4–36)
MCV RBC AUTO: 85.2 FL (ref 80–98)
MONOCYTES # BLD AUTO: 0.49 10*3/MM3 (ref 0–0.9)
MONOCYTES NFR BLD AUTO: 7.5 % (ref 0–12)
NEUTROPHILS # BLD AUTO: 4.09 10*3/MM3 (ref 2–8.6)
NEUTROPHILS NFR BLD AUTO: 62.2 % (ref 37–80)
PLATELET # BLD AUTO: 301 10*3/MM3 (ref 150–450)
PMV BLD AUTO: 9.8 FL (ref 8–12)
POTASSIUM BLD-SCNC: 3.7 MMOL/L (ref 3.5–5.1)
PROT SERPL-MCNC: 6.7 G/DL (ref 6.3–8.6)
RBC # BLD AUTO: 4.58 10*6/MM3 (ref 3.77–5.16)
SODIUM BLD-SCNC: 138 MMOL/L (ref 137–145)
TRIGL SERPL-MCNC: 118 MG/DL (ref 0–149)
WBC NRBC COR # BLD: 6.57 10*3/MM3 (ref 3.2–9.8)

## 2018-12-13 PROCEDURE — 99213 OFFICE O/P EST LOW 20 MIN: CPT | Performed by: FAMILY MEDICINE

## 2018-12-13 PROCEDURE — 80061 LIPID PANEL: CPT | Performed by: FAMILY MEDICINE

## 2018-12-13 NOTE — PROGRESS NOTES
"CC:   Chief Complaint   Patient presents with   • Follow-up     Patient here for OMT   • Hypertension       History:  Deysi Valentine is a 48 y.o. female who presents today for follow-up for evaluation of the above:    Here for follow up HTN, after OMT stopped losartan 3 days following Tx and has since has blood pressure 120-130/70-80s. Felt better after OMT treatment. Has developed dysmenorrhea, seen by OB/GYN yesterday for workup, otherwise feels well.       ROS:  Review of Systems   Constitutional: Negative for fatigue.   Genitourinary: Positive for menstrual problem.   Psychiatric/Behavioral: Negative.      Ms. Valentine  reports that  has never smoked. she has never used smokeless tobacco. She reports that she does not drink alcohol or use drugs.      Current Outpatient Medications:   •  desvenlafaxine (PRISTIQ) 50 MG 24 hr tablet, Take 1 tablet by mouth Daily., Disp: 90 tablet, Rfl: 4  •  NON FORMULARY, Take 1 tablet by mouth Daily. Integrative Gluten free multivitamin with iron, Disp: , Rfl:   •  Norethin-Eth Estrad-Fe Biphas (LO LOESTRIN FE) 1 MG-10 MCG / 10 MCG tablet, Take 1 tablet by mouth Daily., Disp: 84 tablet, Rfl: 3  •  losartan (COZAAR) 50 MG tablet, Take 1 tablet by mouth Daily., Disp: 30 tablet, Rfl: 1    OBJECTIVE:  /94 (BP Location: Left arm, Patient Position: Sitting, Cuff Size: Adult)   Pulse 88   Temp 98.8 °F (37.1 °C) (Temporal)   Resp 12   Ht 154.9 cm (61\")   Wt 67.4 kg (148 lb 9 oz)   LMP 11/21/2018 (Exact Date)   SpO2 98%   BMI 28.07 kg/m²    Physical Exam   Constitutional: She is oriented to person, place, and time. No distress.   HENT:   Head: Normocephalic and atraumatic.   Nose: Nose normal.   Eyes: Conjunctivae are normal. Right eye exhibits no discharge. Left eye exhibits no discharge. No scleral icterus.   Neck: No tracheal deviation present.   Pulmonary/Chest: Effort normal.   Abdominal:   Improved congestion compared to last visit   Musculoskeletal:   Improved respiratory " compliance (primary and secondary) throughout   Neurological: She is alert and oriented to person, place, and time.   Skin: Skin is warm and dry. She is not diaphoretic. No pallor.   Psychiatric: She has a normal mood and affect. Her behavior is normal. Judgment and thought content normal.   Nursing note and vitals reviewed.    Assessment/Plan    Problem List Items Addressed This Visit     Hypertension - Primary     Improved with OMT, not requiring losartan  -weekly BP monitoring  -f/u if > 140/90  -recommend exercise regimen           Other Visit Diagnoses     Screening for lipid disorders        elevated LDL 1 yr ago, recheck today    Relevant Orders    Lipid panel        Patient's Body mass index is 28.07 kg/m². BMI is above normal parameters. Recommendations include: exercise counseling.    An After Visit Summary was printed and given to the patient at discharge.  Return for Annual physical. Sooner if problems arise.         Tomas Giron D.O.  Family Medicine  [facility]

## 2018-12-13 NOTE — ASSESSMENT & PLAN NOTE
Improved with OMT, not requiring losartan, well controlled at home with elevation in our office today, WNL at yesterday's OB/GYN appt  -weekly BP monitoring  -f/u if > 140/90   -recommend exercise regimen

## 2018-12-13 NOTE — TELEPHONE ENCOUNTER
Left message on voice mail lab ok no medication needed       ----- Message from Tomas Giron DO sent at 12/13/2018 11:21 AM CST -----  Cholesterol ok, no need for medication at this time. Recommend exercise most days, high fiber diet. Please let pt know.

## 2018-12-14 LAB
25(OH)D3 SERPL-MCNC: 26.1 NG/ML (ref 30–100)
ESTRADIOL SERPL HS-MCNC: 93.2 PG/ML
FOLATE SERPL-MCNC: 13.8 NG/ML (ref 2.76–21)
FSH SERPL-ACNC: 6.5 MIU/ML
LH SERPL-ACNC: 2.77 MIU/ML
TSH SERPL DL<=0.05 MIU/L-ACNC: 2.27 MIU/ML (ref 0.46–4.68)
VIT B12 BLD-MCNC: 572 PG/ML (ref 239–931)

## 2018-12-17 LAB
GEN CATEG CVX/VAG CYTO-IMP: NORMAL
LAB AP CASE REPORT: NORMAL
LAB AP GYN ADDITIONAL INFORMATION: NORMAL
LAB AP GYN OTHER FINDINGS: NORMAL
PATH INTERP SPEC-IMP: NORMAL
STAT OF ADQ CVX/VAG CYTO-IMP: NORMAL

## 2018-12-19 LAB — HPV I/H RISK 4 DNA CVX QL PROBE+SIG AMP: NEGATIVE

## 2018-12-20 ENCOUNTER — APPOINTMENT (OUTPATIENT)
Dept: MAMMOGRAPHY | Facility: HOSPITAL | Age: 48
End: 2018-12-20

## 2018-12-20 ENCOUNTER — APPOINTMENT (OUTPATIENT)
Dept: ULTRASOUND IMAGING | Facility: HOSPITAL | Age: 48
End: 2018-12-20

## 2018-12-27 ENCOUNTER — HOSPITAL ENCOUNTER (OUTPATIENT)
Dept: MAMMOGRAPHY | Facility: HOSPITAL | Age: 48
Discharge: HOME | End: 2018-12-27
Admitting: OBSTETRICS & GYNECOLOGY

## 2018-12-27 ENCOUNTER — HOSPITAL ENCOUNTER (OUTPATIENT)
Dept: ULTRASOUND IMAGING | Facility: HOSPITAL | Age: 48
Discharge: HOME | End: 2018-12-27

## 2018-12-27 DIAGNOSIS — Z12.31 ENCOUNTER FOR SCREENING MAMMOGRAM FOR BREAST CANCER: ICD-10-CM

## 2018-12-27 DIAGNOSIS — N93.9 ABNORMAL UTERINE BLEEDING: ICD-10-CM

## 2018-12-27 PROCEDURE — 76830 TRANSVAGINAL US NON-OB: CPT

## 2018-12-27 PROCEDURE — 77067 SCR MAMMO BI INCL CAD: CPT

## 2018-12-27 PROCEDURE — 77063 BREAST TOMOSYNTHESIS BI: CPT

## 2019-02-06 ENCOUNTER — OFFICE VISIT (OUTPATIENT)
Dept: OBSTETRICS AND GYNECOLOGY | Facility: CLINIC | Age: 49
End: 2019-02-06

## 2019-02-06 VITALS
HEART RATE: 96 BPM | BODY MASS INDEX: 27.72 KG/M2 | SYSTOLIC BLOOD PRESSURE: 118 MMHG | DIASTOLIC BLOOD PRESSURE: 78 MMHG | WEIGHT: 146.8 LBS | HEIGHT: 61 IN

## 2019-02-06 DIAGNOSIS — F33.41 RECURRENT MAJOR DEPRESSIVE DISORDER, IN PARTIAL REMISSION (HCC): ICD-10-CM

## 2019-02-06 DIAGNOSIS — I10 ESSENTIAL HYPERTENSION: ICD-10-CM

## 2019-02-06 DIAGNOSIS — N30.00 ACUTE CYSTITIS WITHOUT HEMATURIA: ICD-10-CM

## 2019-02-06 DIAGNOSIS — N93.9 ABNORMAL UTERINE BLEEDING: Primary | ICD-10-CM

## 2019-02-06 DIAGNOSIS — K90.0 CELIAC DISEASE: ICD-10-CM

## 2019-02-06 PROCEDURE — 99214 OFFICE O/P EST MOD 30 MIN: CPT | Performed by: OBSTETRICS & GYNECOLOGY

## 2019-02-06 RX ORDER — NITROFURANTOIN 25; 75 MG/1; MG/1
100 CAPSULE ORAL 2 TIMES DAILY
Qty: 20 CAPSULE | Refills: 0 | Status: SHIPPED | OUTPATIENT
Start: 2019-02-06 | End: 2019-02-16

## 2019-02-07 NOTE — PROGRESS NOTES
Deysi Valentine is a 48 y.o. y/o female     Chief Complaint: Abnormal uterine bleeding, manage depression and other gynecologic issues, labs    HPI: 48-year-old  with three prior vaginal deliveries.  Her  has a vasectomy.      Deysi's  is a [employer]  in [place].    She is a pharmacy tech.    She has a nonsmoker.    She has a history of cardiac supraventricular tachycardia, but she is not having any problems with this since ablation therapy in 2008.    She has recently been diagnosed with celiac disease.  She manages this well with a gluten-free diet.    She has had some problems with anemia.    She has had some fairly significant issues with depression and finds that Pristiq 50 mg per day works well for her.    Last menstrual period 2017.  Her periods last about 4 days.  They're not particularly heavy.  She has no intermenstrual bleeding.  Her periods are regular.    They have 3 boys.  As of 2018:  Keith is attending law school at [employer].  Jose is a Freshman at [employer]  Dakota is 12 years old and in the seventh grade.    Her family history is significant for mother  at age 65.  Father  at age 84.  She has a sister who  at age 61.    2000 1846.8 pounds with BMI 27.7.  Blood pressure 130/80.  Pulse 95.  Over the past year, Deysi had some problems with hypotension, so she stopped taking the Cozaar 50 mg per day.  Her blood pressures a little up today.  She is having some problems with heavier and more irregular bleeding.  Her Pap smear was performed today.  It feels like her uterus is somewhat enlarged.  We will check blood work today and get her scheduled for mammogram and pelvic ultrasound.  I refilled her Pristiq.  I gave her samples of Lo Loestrin OCPs.  I will see her back in 6-8 weeks.  Follow-up sooner as needed.    Pap smear 2018, negative for intraepithelial lesion or malignancy.  Endometrial cells  present.  High risk HPV negative.    December 13, 2019, Total cholesterol 177, triglycerides 118, HDL 34, , LDL/HDL ratio 3.51.  LH 2.77.  FSH 6.5.  Estradiol 93.2.  Folate 13.8.  Vitamin D 26.1.  B12 572.  TSH 2.27.  CMP normal.  CBC normal with hemoglobin 13.2 and hematocrit 39.0 and MCV 85.2.    Pelvic ultrasound December 27, 2018 showed uterus measuring 9 x 4.6 x 5.2 cm.  The endometrium measures 1.5 cm in double wall thickness.  Small fibroid in the fundus.  Thickened walls consistent with adenomyosis.  Ovaries appeared normal bilaterally.    Normal mammogram December 27, 2018.    February 6, 2019.  146.8 pounds with BMI 27.7.  She stopped the birth control pills, because she did not feel well on them.  Her periods have now returned to normal.  I reviewed all of the results above with her in detail.  She will take vitamin D3 5000 international units/day.  She has a urinary tract infection which we will treat with Macrobid.  I have encouraged her in diet and exercise.  Continue Pristiq 50 mg/day.  I spent 25 minutes in direct patient care with this patient. 20 minutes, or more than 50% of this encounter, was spent in counseling and coordination of care.     Review of Systems   Constitutional: Positive for fatigue. Negative for activity change, appetite change, chills, diaphoresis, fever and unexpected weight change.   Gastrointestinal: Negative for abdominal pain, constipation, diarrhea and nausea.   Genitourinary: Positive for menstrual problem. Negative for difficulty urinating, dyspareunia, dysuria, pelvic pain, urgency, vaginal bleeding, vaginal discharge and vaginal pain.   Neurological: Negative for headaches.   Psychiatric/Behavioral: Positive for dysphoric mood. The patient is nervous/anxious.    All other systems reviewed and are negative.     Breast ROS: negative    The following portions of the patient's history were reviewed and updated as appropriate: allergies, current medications, past  family history, past medical history, past social history, past surgical history and problem list.    Allergies   Allergen Reactions   • Amoxicillin Rash   • Penicillins Rash          Current Outpatient Medications:   •  desvenlafaxine (PRISTIQ) 50 MG 24 hr tablet, Take 1 tablet by mouth Daily., Disp: 90 tablet, Rfl: 4  •  NON FORMULARY, Take 1 tablet by mouth Daily. Integrative Gluten free multivitamin with iron, Disp: , Rfl:   •  nitrofurantoin, macrocrystal-monohydrate, (MACROBID) 100 MG capsule, Take 1 capsule by mouth 2 (Two) Times a Day for 10 days., Disp: 20 capsule, Rfl: 0     The patient has a family history of   Family History   Problem Relation Age of Onset   • Heart attack Father    • Depression Mother    • Heart attack Mother    • No Known Problems Sister    • No Known Problems Brother    • No Known Problems Daughter    • No Known Problems Son    • No Known Problems Maternal Grandmother    • No Known Problems Paternal Grandmother    • No Known Problems Maternal Aunt    • No Known Problems Paternal Aunt    • Colon cancer Neg Hx    • Colon polyps Neg Hx    • BRCA 1/2 Neg Hx    • Breast cancer Neg Hx    • Endometrial cancer Neg Hx    • Ovarian cancer Neg Hx         Past Medical History:   Diagnosis Date   • Anemia    • Anxiety    • Arrhythmia    • Celiac disease    • Celiac disease    • Depression    • Hx of colonic polyps    • Hypertension    • Recurrent major depressive disorder, in partial remission (CMS/HCC) 2017        OB History      Para Term  AB Living    3 3 3     3    SAB TAB Ectopic Molar Multiple Live Births                          Social History     Socioeconomic History   • Marital status:      Spouse name: Not on file   • Number of children: Not on file   • Years of education: Not on file   • Highest education level: Not on file   Social Needs   • Financial resource strain: Not on file   • Food insecurity - worry: Not on file   • Food insecurity - inability: Not  "on file   • Transportation needs - medical: Not on file   • Transportation needs - non-medical: Not on file   Occupational History   • Not on file   Tobacco Use   • Smoking status: Never Smoker   • Smokeless tobacco: Never Used   Substance and Sexual Activity   • Alcohol use: No   • Drug use: No   • Sexual activity: Yes     Partners: Male     Birth control/protection: None   Other Topics Concern   • Not on file   Social History Narrative   • Not on file        Past Surgical History:   Procedure Laterality Date   • CARDIAC ABLATION     • COLONOSCOPY N/A 4/13/2017    Mild chronic inflammation changes constistent with sprue, Hyperplastic polyp 15 cm proximal to the anus repeat exam in 10 years   • ENDOSCOPY N/A 4/13/2017    LA Grade A reflux esophagitis        Patient Active Problem List   Diagnosis   • Anemia   • Heme + stool   • Nonsmoker   • Celiac disease   • Recurrent major depressive disorder, in partial remission (CMS/HCC)   • Hypertension   • Overweight (BMI 25.0-29.9)        Documented Vitals    02/06/19 0940   BP: 118/78   Pulse: 96   Weight: 66.6 kg (146 lb 12.8 oz)   Height: 154.9 cm (61\")       Physical Exam   Constitutional: She is oriented to person, place, and time. No distress.   Overweight white female weighing 146.8 pounds with BMI 27.7  Blood pressure 118/78..  Pulse 96   HENT:   Head: Normocephalic and atraumatic.   Eyes: Conjunctivae and EOM are normal. Pupils are equal, round, and reactive to light.   Neck: Normal range of motion. Neck supple. No JVD present. No tracheal deviation present. No thyromegaly present.   Cardiovascular: Normal rate, regular rhythm, normal heart sounds and intact distal pulses. Exam reveals no gallop and no friction rub.   No murmur heard.  Pulmonary/Chest: Effort normal and breath sounds normal. No stridor. No respiratory distress. She has no wheezes. She has no rales. She exhibits no tenderness.   Abdominal: Soft. Bowel sounds are normal. She exhibits no distension " and no mass. There is no tenderness. There is no rebound and no guarding. No hernia.   Musculoskeletal: Normal range of motion. She exhibits no edema, tenderness or deformity.   Lymphadenopathy:     She has no cervical adenopathy.   Neurological: She is alert and oriented to person, place, and time. She has normal reflexes. She displays normal reflexes. No cranial nerve deficit. She exhibits normal muscle tone. Coordination normal.   Skin: Skin is warm and dry. No rash noted. She is not diaphoretic. No erythema. No pallor.   Psychiatric: She has a normal mood and affect. Her behavior is normal. Judgment and thought content normal.   Nursing note and vitals reviewed.       Assessment        Diagnosis Plan   1. Abnormal uterine bleeding     2. Acute cystitis without hematuria     3. Recurrent major depressive disorder, in partial remission (CMS/HCC)     4. Essential hypertension     5. Celiac disease           Plan      1. Macrobid.  2. Continue Pristiq 50 mg per day.  3. Recommend vitamin D3 and B12 supplementation.  4. Encouraged in diet and exercise.  5. Handouts on depression, hot flashes, exercise, and vitamin use.   6. Follow-up in 6 months.  Follow-up sooner as needed.            This document has been electronically signed by Trevor Salcedo MD on February 6, 2019 7:26 PM

## 2019-04-29 ENCOUNTER — OFFICE VISIT (OUTPATIENT)
Dept: GASTROENTEROLOGY | Facility: CLINIC | Age: 49
End: 2019-04-29

## 2019-04-29 VITALS
OXYGEN SATURATION: 98 % | HEIGHT: 61 IN | HEART RATE: 82 BPM | DIASTOLIC BLOOD PRESSURE: 72 MMHG | WEIGHT: 151 LBS | BODY MASS INDEX: 28.51 KG/M2 | SYSTOLIC BLOOD PRESSURE: 122 MMHG

## 2019-04-29 DIAGNOSIS — K90.0 CELIAC DISEASE: Primary | ICD-10-CM

## 2019-04-29 PROCEDURE — 99212 OFFICE O/P EST SF 10 MIN: CPT | Performed by: INTERNAL MEDICINE

## 2019-04-29 NOTE — PROGRESS NOTES
[facility]ology - Office note  4/29/2019    Deysi Valentine 1970     Chief Complaint   Patient presents with   • GI Problem     Celiac Disease       HISTORY OF PRESENT ILLNESS    Deysi Valentine is a 48 y.o. female who presents for follow-up.  Celiac disease.  Doing well.  Hemoglobin has normalized.  She is adherent to her diet.  Overall feels well.    Past Medical History:   Diagnosis Date   • Anemia    • Anxiety    • Arrhythmia    • Celiac disease    • Celiac disease    • Depression    • Hx of colonic polyps    • Hypertension    • Recurrent major depressive disorder, in partial remission (CMS/HCC) 11/29/2017       Past Surgical History:   Procedure Laterality Date   • CARDIAC ABLATION     • COLONOSCOPY N/A 4/13/2017    Mild chronic inflammation changes constistent with sprue, Hyperplastic polyp 15 cm proximal to the anus repeat exam in 10 years   • ENDOSCOPY N/A 4/13/2017    LA Grade A reflux esophagitis         Current Outpatient Medications:   •  desvenlafaxine (PRISTIQ) 50 MG 24 hr tablet, Take 1 tablet by mouth Daily., Disp: 90 tablet, Rfl: 4    Allergies   Allergen Reactions   • Amoxicillin Rash   • Penicillins Rash       Social History     Socioeconomic History   • Marital status:      Spouse name: Not on file   • Number of children: Not on file   • Years of education: Not on file   • Highest education level: Not on file   Tobacco Use   • Smoking status: Never Smoker   • Smokeless tobacco: Never Used   Substance and Sexual Activity   • Alcohol use: No   • Drug use: No   • Sexual activity: Yes     Partners: Male     Birth control/protection: None       Family History   Problem Relation Age of Onset   • Heart attack Father    • Depression Mother    • Heart attack Mother    • No Known Problems Sister    • No Known Problems Brother    • No Known Problems Daughter    • No Known Problems Son    • No Known Problems Maternal Grandmother    • No Known Problems Paternal Grandmother    • No Known  "Problems Maternal Aunt    • No Known Problems Paternal Aunt    • Colon cancer Neg Hx    • Colon polyps Neg Hx    • BRCA 1/2 Neg Hx    • Breast cancer Neg Hx    • Endometrial cancer Neg Hx    • Ovarian cancer Neg Hx        Review of Systems    Vitals:    04/29/19 1452   BP: 122/72   Pulse: 82   SpO2: 98%   Weight: 68.5 kg (151 lb)   Height: 154.9 cm (61\")    Body mass index is 28.53 kg/m².    Physical Exam    Lab Results - Last 18 Months   Lab Units 12/12/18  1407   WBC 10*3/mm3 6.57   HEMOGLOBIN g/dL 13.2   HEMATOCRIT % 39.0   MCV fL 85.2   PLATELETS 10*3/mm3 301   GLUCOSE mg/dL 99   BUN mg/dL 8   CREATININE mg/dL 0.73   SODIUM mmol/L 138   POTASSIUM mmol/L 3.7   CHLORIDE mmol/L 102   CO2 mmol/L 26.0   TOTAL PROTEIN g/dL 6.7   ALBUMIN g/dL 4.30   ALT (SGPT) U/L 22   AST (SGOT) U/L 19   ALK PHOS U/L 76   BILIRUBIN mg/dL 0.2   GLOBULIN gm/dL 2.4         ASSESSMENT AND PLAN      1. Celiac disease  Doing well on a gluten-free diet.  Hemoglobin is normalized.  I will plan to see her back as needed.       EMR Dragon/transcription disclaimer: Much of this encounter note is an electronic transcription/translation of spoken language to printed text.  The electronic translation of spoken language may permit erroneous, or at times, nonsensical words or phrases to be inadvertently transcribed.  Although I have reviewed the note for such errors, some may still exist.    Yuniel Painting MD  4/29/2019  3:15 PM  "

## 2020-01-18 ENCOUNTER — NURSE TRIAGE (OUTPATIENT)
Dept: CALL CENTER | Facility: HOSPITAL | Age: 50
End: 2020-01-18

## 2020-01-18 NOTE — TELEPHONE ENCOUNTER
" states Deysi has called Dr. Giron's office and left messages for the last two days but has not gotten a call back. States she needs her antidepressant called in. Does not appear that patient has been seen in the last year and that Dr. Giron has never ordered this medication. Called and spoke with CAMMY Dalton and notified her of situation. She states okay to call in Pristiq 50 mg tablet daily x30 days, no refills. She will need to get into the office/talk to Dr. Giron before any further refills can be called in. This nurse called and spoke with patient's  and let him know that 30 days worth will be called in but that she will need to get into office before further medication refills. Explained to call Monday. He verbalized understanding and requested meds be called into [facility].     Reason for Disposition  • [1] Request for URGENT new prescription or refill of \"essential\" medication (i.e., likelihood of harm to patient if not taken) AND [2] triager unable to fill per unit policy    Additional Information  • Negative: Drug overdose and nurse unable to answer question  • Negative: Caller requesting information not related to medicine  • Negative: Caller requesting a prescription for Strep throat and has a positive culture result  • Negative: Rash while taking a medication or within 3 days of stopping it  • Negative: Immunization reaction suspected  • Negative: [1] Asthma and [2] having symptoms of asthma (cough, wheezing, etc)  • Negative: MORE THAN A DOUBLE DOSE of a prescription or over-the-counter (OTC) drug  • Negative: [1] DOUBLE DOSE (an extra dose or lesser amount) of over-the-counter (OTC) drug AND [2] any symptoms (e.g., dizziness, nausea, pain, sleepiness)  • Negative: [1] DOUBLE DOSE (an extra dose or lesser amount) of prescription drug AND [2] any symptoms (e.g., dizziness, nausea, pain, sleepiness)  • Negative: Took another person's prescription drug  • Negative: [1] " "DOUBLE DOSE (an extra dose or lesser amount) of prescription drug AND [2] NO symptoms (Exception: a double dose of antibiotics)  • Negative: Diabetes drug error or overdose (e.g., insulin or extra dose)    Answer Assessment - Initial Assessment Questions  1. SYMPTOMS: \"Do you have any symptoms?\"      See note  2. SEVERITY: If symptoms are present, ask \"Are they mild, moderate or severe?\"      n/a    Protocols used: MEDICATION QUESTION CALL-ADULT-      "

## 2020-01-28 ENCOUNTER — OFFICE VISIT (OUTPATIENT)
Dept: INTERNAL MEDICINE | Facility: CLINIC | Age: 50
End: 2020-01-28

## 2020-01-28 VITALS
TEMPERATURE: 98.3 F | WEIGHT: 155 LBS | HEIGHT: 61 IN | DIASTOLIC BLOOD PRESSURE: 90 MMHG | OXYGEN SATURATION: 98 % | RESPIRATION RATE: 16 BRPM | SYSTOLIC BLOOD PRESSURE: 156 MMHG | BODY MASS INDEX: 29.27 KG/M2 | HEART RATE: 82 BPM

## 2020-01-28 DIAGNOSIS — F33.41 RECURRENT MAJOR DEPRESSIVE DISORDER, IN PARTIAL REMISSION (HCC): ICD-10-CM

## 2020-01-28 DIAGNOSIS — E66.3 OVERWEIGHT (BMI 25.0-29.9): ICD-10-CM

## 2020-01-28 DIAGNOSIS — I10 ESSENTIAL HYPERTENSION: Primary | ICD-10-CM

## 2020-01-28 PROCEDURE — 99214 OFFICE O/P EST MOD 30 MIN: CPT | Performed by: FAMILY MEDICINE

## 2020-01-28 RX ORDER — DESVENLAFAXINE SUCCINATE 50 MG/1
50 TABLET, EXTENDED RELEASE ORAL DAILY
Qty: 90 TABLET | Refills: 3 | Status: SHIPPED | OUTPATIENT
Start: 2020-01-28 | End: 2021-01-18 | Stop reason: SDUPTHER

## 2020-01-28 NOTE — PROGRESS NOTES
"CC:   Chief Complaint   Patient presents with   • Follow-up     no complaint today       History:  Deysi Valentine is a 49 y.o. female who presents today for follow-up for evaluation of the above:    Needs refill of Pristiq for well-controlled depression    Increased blood pressure today without taking any home measurements, currently feels well without any symptoms.  Today has been mildly more stressful    ROS:  Review of Systems   Constitutional: Negative.    Respiratory: Negative.    Cardiovascular: Negative.    Neurological: Negative.    Psychiatric/Behavioral: Negative.        Ms. Valentine  reports that she has never smoked. She has never used smokeless tobacco. She reports that she does not drink alcohol or use drugs.      Current Outpatient Medications:   •  desvenlafaxine (PRISTIQ) 50 MG 24 hr tablet, Take 1 tablet by mouth Daily., Disp: 90 tablet, Rfl: 3      OBJECTIVE:  /90 (BP Location: Left arm, Patient Position: Sitting, Cuff Size: Adult)   Pulse 82   Temp 98.3 °F (36.8 °C) (Oral)   Resp 16   Ht 154.9 cm (61\")   Wt 70.3 kg (155 lb)   SpO2 98%   BMI 29.29 kg/m²    Physical Exam   Constitutional: She is oriented to person, place, and time. No distress.   HENT:   Head: Normocephalic and atraumatic.   Nose: Nose normal.   Eyes: Conjunctivae are normal. Right eye exhibits no discharge. Left eye exhibits no discharge. No scleral icterus.   Neck: No tracheal deviation present.   Cardiovascular: Normal rate, regular rhythm and normal heart sounds. Exam reveals no gallop and no friction rub.   No murmur heard.  Pulmonary/Chest: Effort normal and breath sounds normal. No respiratory distress. She has no wheezes. She has no rales.   Neurological: She is alert and oriented to person, place, and time.   Skin: Skin is warm and dry. She is not diaphoretic. No pallor.   Psychiatric: She has a normal mood and affect. Her behavior is normal. Judgment and thought content normal.   Nursing note and vitals " reviewed.      Assessment/Plan     Diagnosis Plan   1. Essential hypertension  Comprehensive Metabolic Panel   2. Recurrent major depressive disorder, in partial remission (CMS/[facility])  desvenlafaxine (PRISTIQ) 50 MG 24 hr tablet   3. Overweight (BMI 25.0-29.9)     Refill Pristiq, and recommended home blood pressure monitoring to see if there is some element of whitecoat syndrome.  She was previously on losartan but then stopped it and had normal blood pressure, she has been gaining more weight and we discussed weight loss, and she will let us know if we need to resume losartan.  Goal blood pressure less than 140/90    Patient's Body mass index is 29.29 kg/m². BMI is above normal parameters. Recommendations include: exercise counseling.      An After Visit Summary was printed and given to the patient at discharge.  Return in about 6 months (around 7/28/2020). Sooner if problems arise.         Tomas Giron D.O.  Family Medicine  Osteopathic Neuromusculoskeletal Medicine

## 2020-02-04 ENCOUNTER — LAB (OUTPATIENT)
Dept: LAB | Facility: HOSPITAL | Age: 50
End: 2020-02-04

## 2020-02-04 DIAGNOSIS — I10 ESSENTIAL HYPERTENSION: ICD-10-CM

## 2020-02-04 LAB
ALBUMIN SERPL-MCNC: 4.1 G/DL (ref 3.5–5.2)
ALBUMIN/GLOB SERPL: 1.9 G/DL
ALP SERPL-CCNC: 89 U/L (ref 39–117)
ALT SERPL W P-5'-P-CCNC: 42 U/L (ref 1–33)
ANION GAP SERPL CALCULATED.3IONS-SCNC: 13.8 MMOL/L (ref 5–15)
AST SERPL-CCNC: 33 U/L (ref 1–32)
BILIRUB SERPL-MCNC: 0.3 MG/DL (ref 0.2–1.2)
BUN BLD-MCNC: 13 MG/DL (ref 6–20)
BUN/CREAT SERPL: 21.3 (ref 7–25)
CALCIUM SPEC-SCNC: 8.8 MG/DL (ref 8.6–10.5)
CHLORIDE SERPL-SCNC: 103 MMOL/L (ref 98–107)
CO2 SERPL-SCNC: 22.2 MMOL/L (ref 22–29)
CREAT BLD-MCNC: 0.61 MG/DL (ref 0.57–1)
GFR SERPL CREATININE-BSD FRML MDRD: 104 ML/MIN/1.73
GLOBULIN UR ELPH-MCNC: 2.2 GM/DL
GLUCOSE BLD-MCNC: 97 MG/DL (ref 65–99)
POTASSIUM BLD-SCNC: 4.2 MMOL/L (ref 3.5–5.2)
PROT SERPL-MCNC: 6.3 G/DL (ref 6–8.5)
SODIUM BLD-SCNC: 139 MMOL/L (ref 136–145)

## 2020-02-04 PROCEDURE — 36415 COLL VENOUS BLD VENIPUNCTURE: CPT

## 2020-02-04 PROCEDURE — 80053 COMPREHEN METABOLIC PANEL: CPT | Performed by: FAMILY MEDICINE

## 2020-02-07 ENCOUNTER — TELEPHONE (OUTPATIENT)
Dept: INTERNAL MEDICINE | Facility: CLINIC | Age: 50
End: 2020-02-07

## 2020-02-07 DIAGNOSIS — N92.6 MENSTRUAL CYCLE PROBLEM: Primary | ICD-10-CM

## 2020-02-07 NOTE — TELEPHONE ENCOUNTER
PLEASE CALL PT .. SHE INSISTED ON TALKING WITH SOMEONE .. I TRIED TO SCHEDULE HER AN APPT AND SHE DID NOT WANT THAT.

## 2020-02-07 NOTE — TELEPHONE ENCOUNTER
Spoke with patient she states that she was seen on 1/28/2020 and she started her menstrual cycle and she has not stopped, she was wanting a referral to Dr Alanis. She has not seen a obgyn in years. Thank you

## 2020-03-09 ENCOUNTER — OFFICE VISIT (OUTPATIENT)
Dept: OBSTETRICS AND GYNECOLOGY | Facility: CLINIC | Age: 50
End: 2020-03-09

## 2020-03-09 VITALS
DIASTOLIC BLOOD PRESSURE: 68 MMHG | BODY MASS INDEX: 28.89 KG/M2 | WEIGHT: 153 LBS | SYSTOLIC BLOOD PRESSURE: 114 MMHG | HEIGHT: 61 IN

## 2020-03-09 DIAGNOSIS — N92.4 EXCESSIVE BLEEDING IN PREMENOPAUSAL PERIOD: ICD-10-CM

## 2020-03-09 DIAGNOSIS — Z01.419 ENCOUNTER FOR GYNECOLOGICAL EXAMINATION WITHOUT ABNORMAL FINDING: Primary | ICD-10-CM

## 2020-03-09 DIAGNOSIS — Z12.4 ENCOUNTER FOR SCREENING FOR CERVICAL CANCER: ICD-10-CM

## 2020-03-09 DIAGNOSIS — Z12.31 SCREENING MAMMOGRAM, ENCOUNTER FOR: ICD-10-CM

## 2020-03-09 LAB
B-HCG UR QL: NEGATIVE
INTERNAL NEGATIVE CONTROL: NEGATIVE
INTERNAL POSITIVE CONTROL: POSITIVE
Lab: NORMAL

## 2020-03-09 PROCEDURE — 81025 URINE PREGNANCY TEST: CPT | Performed by: OBSTETRICS & GYNECOLOGY

## 2020-03-09 PROCEDURE — G0123 SCREEN CERV/VAG THIN LAYER: HCPCS | Performed by: OBSTETRICS & GYNECOLOGY

## 2020-03-09 PROCEDURE — 99386 PREV VISIT NEW AGE 40-64: CPT | Performed by: OBSTETRICS & GYNECOLOGY

## 2020-03-09 PROCEDURE — 87624 HPV HI-RISK TYP POOLED RSLT: CPT | Performed by: OBSTETRICS & GYNECOLOGY

## 2020-03-09 NOTE — PROGRESS NOTES
Subjective   Deysi Valentine is a 49 y.o. female  YOB: 1970    Chief Complaint   Patient presents with   • Menstrual Problem     PT is here for prolong periods.  PT states that her period this past month was longer that normal.  Pt states that her cycles usually last about 5 days this time it was 14 days long  last pap was 18 normal         49 year old female  Patient's last menstrual period was 2020 (exact date) presents plaints of menorrhagia.  Patient reports that her last nostril cycle lasted approximately 2 weeks and that she had to change a pad approximately every couple of hours as well as that she passed clots.  Her last annual examination was in 2018.  Reports that prior to her last cycle her cycles were regular occurring monthly lasting for approximately 5 days.  Previously had a transvaginal ultrasound in 2018 that revealed possible adenomyosis as well as a fibroid.  She has had no follow-up ultrasound.  Her last Pap smear was in 2018 was negative for intraepithelial lesion as well as negative HPV.  Her last mammogram was also in 2018 and was benign.  She is previously had a colonoscopy in 2017 with recommendations for follow-up in 10 years.  Reports that she is sexually active with her  at this time without complaints.  He has had a vasectomy.  She denies any family history of breast ovarian or endometrial cancer.      Allergies   Allergen Reactions   • Amoxicillin Rash   • Penicillins Rash       Past Medical History:   Diagnosis Date   • Anemia    • Anxiety    • Arrhythmia    • Celiac disease    • Celiac disease    • Depression    • Hx of colonic polyps    • Hypertension    • Recurrent major depressive disorder, in partial remission (CMS/HCC) 2017       Family History   Problem Relation Age of Onset   • Heart attack Father    • Depression Mother    • Heart attack Mother    • No Known Problems Sister    • No Known Problems Brother    • No Known Problems Daughter     • No Known Problems Son    • No Known Problems Maternal Grandmother    • No Known Problems Paternal Grandmother    • No Known Problems Maternal Aunt    • No Known Problems Paternal Aunt    • Colon cancer Neg Hx    • Colon polyps Neg Hx    • BRCA 1/2 Neg Hx    • Breast cancer Neg Hx    • Endometrial cancer Neg Hx    • Ovarian cancer Neg Hx        Social History     Socioeconomic History   • Marital status:      Spouse name: Not on file   • Number of children: Not on file   • Years of education: Not on file   • Highest education level: Not on file   Tobacco Use   • Smoking status: Never Smoker   • Smokeless tobacco: Never Used   Substance and Sexual Activity   • Alcohol use: No   • Drug use: No   • Sexual activity: Yes     Partners: Male     Birth control/protection: None         Current Outpatient Medications:   •  desvenlafaxine (PRISTIQ) 50 MG 24 hr tablet, Take 1 tablet by mouth Daily., Disp: 90 tablet, Rfl: 3    Patient's last menstrual period was 01/28/2020 (exact date).    Sexual History:         Could not be calculated    Past Surgical History:   Procedure Laterality Date   • CARDIAC ABLATION     • COLONOSCOPY N/A 4/13/2017    Mild chronic inflammation changes constistent with sprue, Hyperplastic polyp 15 cm proximal to the anus repeat exam in 10 years   • ENDOSCOPY N/A 4/13/2017    LA Grade A reflux esophagitis       Review of Systems   Constitutional: Negative for activity change and unexpected weight loss.   HENT: Negative for congestion.    Cardiovascular: Negative for chest pain.   Gastrointestinal: Negative for blood in stool, constipation and diarrhea.   Endocrine: Negative for cold intolerance and heat intolerance.   Genitourinary: Positive for menstrual problem and vaginal bleeding. Negative for dyspareunia, pelvic pain and vaginal discharge.   Musculoskeletal: Positive for neck pain. Negative for arthralgias, back pain and neck stiffness.   Skin: Negative for rash.   Neurological: Positive  "for headache (tension headaches). Negative for dizziness.   Psychiatric/Behavioral: Positive for sleep disturbance. The patient is not nervous/anxious.        Objective   Physical Exam   Constitutional: She is oriented to person, place, and time. She appears well-developed and well-nourished. No distress.   HENT:   Head: Normocephalic and atraumatic.   Eyes: EOM are normal.   Neck: Normal range of motion. Neck supple.   Cardiovascular: Normal rate and regular rhythm.   No murmur heard.  Pulmonary/Chest: Effort normal and breath sounds normal. Right breast exhibits no inverted nipple and no mass. Left breast exhibits no inverted nipple and no mass. No breast tenderness or discharge.   Abdominal: Soft. She exhibits no distension. There is no tenderness.   Genitourinary: Vagina normal. Pelvic exam was performed with patient supine. There is no tenderness or lesion on the right labia. There is no tenderness or lesion on the left labia. Uterus is enlarged and tender. Cervix does not exhibit motion tenderness, discharge or friability. Right adnexum displays no tenderness and no fullness. Left adnexum displays no tenderness and no fullness. No tenderness or bleeding in the vagina. No vaginal discharge found.   Musculoskeletal: Normal range of motion. She exhibits no edema.   Neurological: She is alert and oriented to person, place, and time.   Skin: Skin is warm and dry.   Psychiatric: She has a normal mood and affect. Her behavior is normal. Judgment normal.   Nursing note and vitals reviewed.        Vitals:    03/09/20 0806   BP: 114/68   Weight: 69.4 kg (153 lb)   Height: 154.9 cm (61\")       Deysi was seen today for menstrual problem.    Diagnoses and all orders for this visit:    Encounter for gynecological examination without abnormal finding    Screening mammogram, encounter for  -     Mammo Screening Digital Tomosynthesis Bilateral With CAD; Future    Excessive bleeding in premenopausal period  -     CBC & " Differential  -     POC Pregnancy, Urine    Encounter for screening for cervical cancer   -     Liquid-based Pap Smear, Screening    -Discussed with patient ASC CP guidelines at this time.  She previously had a negative Pap smear as well as negative HPV in 2018.  Patient requesting repeat Pap today.  She denies any history of abnormal Pap smears prior.  -Referral for mammogram placed at this time.  -Urine pregnancy test today was negative.  Will check hemoglobin  -Return to clinic in 2 weeks for transvaginal ultrasound or sooner if symptoms worsen.  -Consider increasing Pristiq at next visit.   -All questions answered and patient verbalized understanding of plan.    Carol Nixon, DO

## 2020-03-10 LAB
BASOPHILS # BLD AUTO: 0.04 10*3/MM3 (ref 0–0.2)
BASOPHILS NFR BLD AUTO: 0.7 % (ref 0–1.5)
EOSINOPHIL # BLD AUTO: 0.27 10*3/MM3 (ref 0–0.4)
EOSINOPHIL NFR BLD AUTO: 4.6 % (ref 0.3–6.2)
ERYTHROCYTE [DISTWIDTH] IN BLOOD BY AUTOMATED COUNT: 12.3 % (ref 12.3–15.4)
HCT VFR BLD AUTO: 35.1 % (ref 34–46.6)
HGB BLD-MCNC: 11.7 G/DL (ref 12–15.9)
IMM GRANULOCYTES # BLD AUTO: 0.02 10*3/MM3 (ref 0–0.05)
IMM GRANULOCYTES NFR BLD AUTO: 0.3 % (ref 0–0.5)
LYMPHOCYTES # BLD AUTO: 1.68 10*3/MM3 (ref 0.7–3.1)
LYMPHOCYTES NFR BLD AUTO: 28.3 % (ref 19.6–45.3)
MCH RBC QN AUTO: 28.2 PG (ref 26.6–33)
MCHC RBC AUTO-ENTMCNC: 33.3 G/DL (ref 31.5–35.7)
MCV RBC AUTO: 84.6 FL (ref 79–97)
MONOCYTES # BLD AUTO: 0.58 10*3/MM3 (ref 0.1–0.9)
MONOCYTES NFR BLD AUTO: 9.8 % (ref 5–12)
NEUTROPHILS # BLD AUTO: 3.34 10*3/MM3 (ref 1.7–7)
NEUTROPHILS NFR BLD AUTO: 56.3 % (ref 42.7–76)
NRBC BLD AUTO-RTO: 0 /100 WBC (ref 0–0.2)
PLATELET # BLD AUTO: 319 10*3/MM3 (ref 140–450)
RBC # BLD AUTO: 4.15 10*6/MM3 (ref 3.77–5.28)
WBC # BLD AUTO: 5.93 10*3/MM3 (ref 3.4–10.8)

## 2020-03-11 LAB
GEN CATEG CVX/VAG CYTO-IMP: NORMAL
HPV I/H RISK 4 DNA CVX QL PROBE+SIG AMP: NOT DETECTED
LAB AP CASE REPORT: NORMAL
LAB AP GYN ADDITIONAL INFORMATION: NORMAL
LAB AP GYN OTHER FINDINGS: NORMAL
PATH INTERP SPEC-IMP: NORMAL
STAT OF ADQ CVX/VAG CYTO-IMP: NORMAL

## 2020-03-16 ENCOUNTER — HOSPITAL ENCOUNTER (OUTPATIENT)
Dept: MAMMOGRAPHY | Facility: HOSPITAL | Age: 50
Discharge: HOME | End: 2020-03-16
Admitting: OBSTETRICS & GYNECOLOGY

## 2020-03-16 DIAGNOSIS — Z12.31 SCREENING MAMMOGRAM, ENCOUNTER FOR: ICD-10-CM

## 2020-03-16 PROCEDURE — 77067 SCR MAMMO BI INCL CAD: CPT

## 2020-03-16 PROCEDURE — 77063 BREAST TOMOSYNTHESIS BI: CPT

## 2020-05-01 ENCOUNTER — TELEMEDICINE (OUTPATIENT)
Dept: INTERNAL MEDICINE | Facility: CLINIC | Age: 50
End: 2020-05-01

## 2020-05-01 DIAGNOSIS — F33.41 RECURRENT MAJOR DEPRESSIVE DISORDER, IN PARTIAL REMISSION (HCC): ICD-10-CM

## 2020-05-01 DIAGNOSIS — R03.0 ELEVATED BLOOD PRESSURE READING IN OFFICE WITHOUT DIAGNOSIS OF HYPERTENSION: Primary | ICD-10-CM

## 2020-05-01 PROCEDURE — 99214 OFFICE O/P EST MOD 30 MIN: CPT | Performed by: FAMILY MEDICINE

## 2020-05-01 NOTE — PROGRESS NOTES
CC:   Chief Complaint   Patient presents with   • Follow-up   • Hypertension       History:  Deysi Valentine is a 49 y.o. female who presents today for follow-up for evaluation of the above:    Seen via video visit  The use of a video visit has been reviewed with the patient and verbal informed consent has been obtained.    Blood pressure at home only elevated in the 140s when stressed, when calm it is 120s/80s. Not taking losartan. Working on exercise and hydration, mood well controlled on pristiq    ROS:  Review of Systems   Constitutional: Negative.    Respiratory: Negative.    Cardiovascular: Negative.    Neurological: Negative.    Psychiatric/Behavioral: Negative.        Ms. Valentine  reports that she has never smoked. She has never used smokeless tobacco. She reports that she does not drink alcohol or use drugs.      Current Outpatient Medications:   •  desvenlafaxine (PRISTIQ) 50 MG 24 hr tablet, Take 1 tablet by mouth Daily., Disp: 90 tablet, Rfl: 3      OBJECTIVE:  There were no vitals taken for this visit.   Physical Exam   Constitutional: She is oriented to person, place, and time. No distress.   HENT:   Head: Normocephalic and atraumatic.   Nose: Nose normal.   Eyes: Conjunctivae are normal. Right eye exhibits no discharge. Left eye exhibits no discharge. No scleral icterus.   Neck: No tracheal deviation present.   Pulmonary/Chest: Effort normal.   Neurological: She is alert and oriented to person, place, and time.   Skin: She is not diaphoretic. No pallor.   Psychiatric: She has a normal mood and affect. Her behavior is normal. Judgment and thought content normal.       Assessment/Plan     Diagnosis Plan   1. Elevated blood pressure reading in office without diagnosis of hypertension     2. Recurrent major depressive disorder, in partial remission (CMS/HCC)     stable, continue pristiq  Monthly BP measurements, goal less than 140/90    An After Visit Summary was printed and given to the patient at  discharge.  Return in about 6 months (around 11/1/2020). Sooner if problems arise.         Tomas Giron D.O.  Family Medicine  Osteopathic Neuromusculoskeletal Medicine

## 2020-05-14 ENCOUNTER — TELEMEDICINE (OUTPATIENT)
Dept: FAMILY MEDICINE CLINIC | Facility: TELEHEALTH | Age: 50
End: 2020-05-14

## 2020-05-14 DIAGNOSIS — N89.8 VAGINAL ITCHING: ICD-10-CM

## 2020-05-14 DIAGNOSIS — N39.0 URINARY TRACT INFECTION WITHOUT HEMATURIA, SITE UNSPECIFIED: Primary | ICD-10-CM

## 2020-05-14 PROCEDURE — 99213 OFFICE O/P EST LOW 20 MIN: CPT | Performed by: NURSE PRACTITIONER

## 2020-05-14 RX ORDER — PHENAZOPYRIDINE HYDROCHLORIDE 200 MG/1
200 TABLET, FILM COATED ORAL 3 TIMES DAILY PRN
Qty: 6 TABLET | Refills: 0 | Status: SHIPPED | OUTPATIENT
Start: 2020-05-14 | End: 2020-05-16

## 2020-05-14 RX ORDER — SULFAMETHOXAZOLE AND TRIMETHOPRIM 800; 160 MG/1; MG/1
1 TABLET ORAL 2 TIMES DAILY
Qty: 6 TABLET | Refills: 0 | Status: SHIPPED | OUTPATIENT
Start: 2020-05-14 | End: 2020-05-17

## 2020-05-14 NOTE — PROGRESS NOTES
Subjective   Deysi Valentine is a 49 y.o. female who presents to the clinic with:        Last UTI was approx few years ago.  She also may have yeast infection since outer itching and white discharge.    Urinary Tract Infection    This is a new problem. The current episode started yesterday. The problem occurs every urination. The problem has been unchanged. The quality of the pain is described as aching. The pain is moderate. There has been no fever. She is not sexually active. There is no history of pyelonephritis. Associated symptoms include a discharge. Pertinent negatives include no chills, flank pain, frequency, hematuria, hesitancy or urgency. She has tried nothing for the symptoms.          The following portions of the patient's history were reviewed and updated as appropriate: allergies, current medications, past family history, past medical history, past social history, past surgical history and problem list.        Review of Systems   Constitutional: Negative for activity change, chills, fatigue and fever.   Genitourinary: Positive for dysuria and vaginal discharge. Negative for decreased urine volume, difficulty urinating, flank pain, frequency, hematuria, hesitancy, pelvic pain, urgency, vaginal bleeding and vaginal pain.   Musculoskeletal: Negative for back pain.         Objective   Physical Exam   Constitutional: She is oriented to person, place, and time. She appears well-developed and well-nourished.   HENT:   Head: Normocephalic.   Eyes: Pupils are equal, round, and reactive to light. Conjunctivae are normal.   Neck: Normal range of motion.   Pulmonary/Chest: Effort normal.   Abdominal: There is tenderness.   Reports mild abd tenderness   Neurological: She is alert and oriented to person, place, and time.   Psychiatric: She has a normal mood and affect.         Assessment/Plan   Deysi was seen today for urinary tract infection.    Diagnoses and all orders for this visit:    Urinary tract infection  without hematuria, site unspecified    Vaginal itching    Other orders  -     sulfamethoxazole-trimethoprim (BACTRIM DS,SEPTRA DS) 800-160 MG per tablet; Take 1 tablet by mouth 2 (Two) Times a Day for 3 days.  -     phenazopyridine (Pyridium) 200 MG tablet; Take 1 tablet by mouth 3 (Three) Times a Day As Needed for Bladder Spasms for up to 2 days.  -     Clotrimazole 2 % vaginal cream; Apply to external vaginal area/labia and insert one applicator full intravaginally once daily for 3 days.      Increase fluids  Followup with PCP if needed

## 2020-10-20 PROCEDURE — 87635 SARS-COV-2 COVID-19 AMP PRB: CPT | Performed by: NURSE PRACTITIONER

## 2021-01-15 PROCEDURE — U0004 COV-19 TEST NON-CDC HGH THRU: HCPCS | Performed by: NURSE PRACTITIONER

## 2021-01-18 DIAGNOSIS — F33.41 RECURRENT MAJOR DEPRESSIVE DISORDER, IN PARTIAL REMISSION (HCC): ICD-10-CM

## 2021-01-18 RX ORDER — DESVENLAFAXINE SUCCINATE 50 MG/1
50 TABLET, EXTENDED RELEASE ORAL DAILY
Qty: 90 TABLET | Refills: 0 | Status: SHIPPED | OUTPATIENT
Start: 2021-01-18 | End: 2021-02-10 | Stop reason: SDUPTHER

## 2021-01-18 NOTE — TELEPHONE ENCOUNTER
Caller: Deysi Valentine    Relationship: Self    Best call back number: [phone]   Medication needed:   Requested Prescriptions     Pending Prescriptions Disp Refills   • desvenlafaxine (PRISTIQ) 50 MG 24 hr tablet 90 tablet 3     Sig: Take 1 tablet by mouth Daily.       When do you need the refill by: ASAP        Does the patient have less than a 3 day supply:  [x] Yes  [] No    What is the patient's preferred pharmacy: [id number] [place], [place] - [facility] [address] AT [facility] [id number] - [phone] [facility] [phone] FX

## 2021-01-18 NOTE — TELEPHONE ENCOUNTER
Patient requesting refill on Pristq, dx with COVID on  1/15/21.    Ok to send in Rx until she is well enough for appt?

## 2021-02-08 ENCOUNTER — TELEPHONE (OUTPATIENT)
Dept: FAMILY MEDICINE CLINIC | Facility: CLINIC | Age: 51
End: 2021-02-08

## 2021-02-08 DIAGNOSIS — F33.41 RECURRENT MAJOR DEPRESSIVE DISORDER, IN PARTIAL REMISSION (HCC): ICD-10-CM

## 2021-02-08 NOTE — TELEPHONE ENCOUNTER
Patient requesting a refill on Pristiq. Last refill 1/18/2021, qty 90, with no refills.   Last office visit 5/1/2020 ( telemedicine), patient does not have a follow up appointment scheduled. Please advise.

## 2021-02-10 RX ORDER — DESVENLAFAXINE SUCCINATE 50 MG/1
50 TABLET, EXTENDED RELEASE ORAL DAILY
Qty: 90 TABLET | Refills: 0 | Status: SHIPPED | OUTPATIENT
Start: 2021-02-10 | End: 2021-04-20

## 2021-03-30 ENCOUNTER — LAB (OUTPATIENT)
Dept: LAB | Facility: HOSPITAL | Age: 51
End: 2021-03-30

## 2021-03-30 ENCOUNTER — OFFICE VISIT (OUTPATIENT)
Dept: FAMILY MEDICINE CLINIC | Facility: CLINIC | Age: 51
End: 2021-03-30

## 2021-03-30 VITALS
BODY MASS INDEX: 29.6 KG/M2 | TEMPERATURE: 98 F | SYSTOLIC BLOOD PRESSURE: 142 MMHG | WEIGHT: 156.8 LBS | OXYGEN SATURATION: 99 % | HEIGHT: 61 IN | RESPIRATION RATE: 16 BRPM | HEART RATE: 115 BPM | DIASTOLIC BLOOD PRESSURE: 90 MMHG

## 2021-03-30 DIAGNOSIS — Z00.00 ANNUAL PHYSICAL EXAM: ICD-10-CM

## 2021-03-30 DIAGNOSIS — I10 ESSENTIAL HYPERTENSION: Primary | ICD-10-CM

## 2021-03-30 DIAGNOSIS — F33.41 RECURRENT MAJOR DEPRESSIVE DISORDER, IN PARTIAL REMISSION (HCC): ICD-10-CM

## 2021-03-30 LAB
ALBUMIN SERPL-MCNC: 4.1 G/DL (ref 3.5–5.2)
ALBUMIN/GLOB SERPL: 1.6 G/DL
ALP SERPL-CCNC: 114 U/L (ref 39–117)
ALT SERPL W P-5'-P-CCNC: 31 U/L (ref 1–33)
ANION GAP SERPL CALCULATED.3IONS-SCNC: 9.3 MMOL/L (ref 5–15)
AST SERPL-CCNC: 25 U/L (ref 1–32)
BILIRUB SERPL-MCNC: 0.3 MG/DL (ref 0–1.2)
BUN SERPL-MCNC: 12 MG/DL (ref 6–20)
BUN/CREAT SERPL: 19.7 (ref 7–25)
CALCIUM SPEC-SCNC: 9 MG/DL (ref 8.6–10.5)
CHLORIDE SERPL-SCNC: 105 MMOL/L (ref 98–107)
CHOLEST SERPL-MCNC: 171 MG/DL (ref 0–200)
CO2 SERPL-SCNC: 24.7 MMOL/L (ref 22–29)
CREAT SERPL-MCNC: 0.61 MG/DL (ref 0.57–1)
DEPRECATED RDW RBC AUTO: 40.2 FL (ref 37–54)
ERYTHROCYTE [DISTWIDTH] IN BLOOD BY AUTOMATED COUNT: 13 % (ref 12.3–15.4)
GFR SERPL CREATININE-BSD FRML MDRD: 103 ML/MIN/1.73
GLOBULIN UR ELPH-MCNC: 2.6 GM/DL
GLUCOSE SERPL-MCNC: 81 MG/DL (ref 65–99)
HCT VFR BLD AUTO: 42.9 % (ref 34–46.6)
HDLC SERPL-MCNC: 40 MG/DL (ref 40–60)
HGB BLD-MCNC: 14.8 G/DL (ref 12–15.9)
LDLC SERPL CALC-MCNC: 106 MG/DL (ref 0–100)
LDLC/HDLC SERPL: 2.59 {RATIO}
MCH RBC QN AUTO: 29.9 PG (ref 26.6–33)
MCHC RBC AUTO-ENTMCNC: 34.5 G/DL (ref 31.5–35.7)
MCV RBC AUTO: 86.7 FL (ref 79–97)
PLATELET # BLD AUTO: 251 10*3/MM3 (ref 140–450)
PMV BLD AUTO: 9.6 FL (ref 6–12)
POTASSIUM SERPL-SCNC: 4.4 MMOL/L (ref 3.5–5.2)
PROT SERPL-MCNC: 6.7 G/DL (ref 6–8.5)
RBC # BLD AUTO: 4.95 10*6/MM3 (ref 3.77–5.28)
SODIUM SERPL-SCNC: 139 MMOL/L (ref 136–145)
TRIGL SERPL-MCNC: 138 MG/DL (ref 0–150)
TSH SERPL DL<=0.05 MIU/L-ACNC: 2.83 UIU/ML (ref 0.27–4.2)
VLDLC SERPL-MCNC: 25 MG/DL (ref 5–40)
WBC # BLD AUTO: 7.35 10*3/MM3 (ref 3.4–10.8)

## 2021-03-30 PROCEDURE — 99396 PREV VISIT EST AGE 40-64: CPT | Performed by: FAMILY MEDICINE

## 2021-03-30 PROCEDURE — 36415 COLL VENOUS BLD VENIPUNCTURE: CPT

## 2021-03-30 PROCEDURE — 84443 ASSAY THYROID STIM HORMONE: CPT

## 2021-03-30 PROCEDURE — 99214 OFFICE O/P EST MOD 30 MIN: CPT | Performed by: FAMILY MEDICINE

## 2021-03-30 PROCEDURE — 80053 COMPREHEN METABOLIC PANEL: CPT

## 2021-03-30 PROCEDURE — 85027 COMPLETE CBC AUTOMATED: CPT

## 2021-03-30 PROCEDURE — 80061 LIPID PANEL: CPT

## 2021-03-30 RX ORDER — MULTIPLE VITAMINS W/ MINERALS TAB 9MG-400MCG
TAB ORAL
COMMUNITY
Start: 2020-09-01

## 2021-03-30 RX ORDER — BIOTIN 5 MG
TABLET ORAL
COMMUNITY
Start: 2021-03-26

## 2021-03-30 RX ORDER — MELATONIN
COMMUNITY
Start: 2021-01-16

## 2021-03-30 RX ORDER — LOSARTAN POTASSIUM 50 MG/1
50 TABLET ORAL DAILY
Qty: 90 TABLET | Refills: 0 | Status: SHIPPED | OUTPATIENT
Start: 2021-03-30 | End: 2021-06-28

## 2021-04-19 DIAGNOSIS — F33.41 RECURRENT MAJOR DEPRESSIVE DISORDER, IN PARTIAL REMISSION (HCC): ICD-10-CM

## 2021-04-20 RX ORDER — DESVENLAFAXINE SUCCINATE 50 MG/1
50 TABLET, EXTENDED RELEASE ORAL DAILY
Qty: 90 TABLET | Refills: 0 | Status: SHIPPED | OUTPATIENT
Start: 2021-04-20 | End: 2021-07-01 | Stop reason: SDUPTHER

## 2021-05-04 ENCOUNTER — APPOINTMENT (OUTPATIENT)
Dept: GENERAL RADIOLOGY | Facility: HOSPITAL | Age: 51
End: 2021-05-04

## 2021-05-04 ENCOUNTER — HOSPITAL ENCOUNTER (EMERGENCY)
Facility: HOSPITAL | Age: 51
Discharge: HOME | End: 2021-05-04
Attending: EMERGENCY MEDICINE | Admitting: EMERGENCY MEDICINE

## 2021-05-04 VITALS
DIASTOLIC BLOOD PRESSURE: 80 MMHG | WEIGHT: 157 LBS | BODY MASS INDEX: 29.64 KG/M2 | SYSTOLIC BLOOD PRESSURE: 133 MMHG | OXYGEN SATURATION: 96 % | HEIGHT: 61 IN | TEMPERATURE: 97.4 F | HEART RATE: 81 BPM | RESPIRATION RATE: 20 BRPM

## 2021-05-04 DIAGNOSIS — R07.9 CHEST PAIN, UNSPECIFIED TYPE: Primary | ICD-10-CM

## 2021-05-04 DIAGNOSIS — R91.1 PULMONARY NODULE: ICD-10-CM

## 2021-05-04 LAB
ALBUMIN SERPL-MCNC: 4.1 G/DL (ref 3.5–5.2)
ALBUMIN/GLOB SERPL: 1.5 G/DL
ALP SERPL-CCNC: 115 U/L (ref 39–117)
ALT SERPL W P-5'-P-CCNC: 18 U/L (ref 1–33)
ANION GAP SERPL CALCULATED.3IONS-SCNC: 11 MMOL/L (ref 5–15)
AST SERPL-CCNC: 15 U/L (ref 1–32)
B-HCG UR QL: NEGATIVE
BASOPHILS # BLD AUTO: 0.05 10*3/MM3 (ref 0–0.2)
BASOPHILS NFR BLD AUTO: 0.6 % (ref 0–1.5)
BILIRUB SERPL-MCNC: 0.2 MG/DL (ref 0–1.2)
BUN SERPL-MCNC: 10 MG/DL (ref 6–20)
BUN/CREAT SERPL: 14.3 (ref 7–25)
CALCIUM SPEC-SCNC: 9.4 MG/DL (ref 8.6–10.5)
CHLORIDE SERPL-SCNC: 105 MMOL/L (ref 98–107)
CO2 SERPL-SCNC: 25 MMOL/L (ref 22–29)
CREAT SERPL-MCNC: 0.7 MG/DL (ref 0.57–1)
DEPRECATED RDW RBC AUTO: 39.8 FL (ref 37–54)
EOSINOPHIL # BLD AUTO: 0.22 10*3/MM3 (ref 0–0.4)
EOSINOPHIL NFR BLD AUTO: 2.7 % (ref 0.3–6.2)
ERYTHROCYTE [DISTWIDTH] IN BLOOD BY AUTOMATED COUNT: 12.2 % (ref 12.3–15.4)
GFR SERPL CREATININE-BSD FRML MDRD: 88 ML/MIN/1.73
GLOBULIN UR ELPH-MCNC: 2.7 GM/DL
GLUCOSE SERPL-MCNC: 93 MG/DL (ref 65–99)
HCT VFR BLD AUTO: 45 % (ref 34–46.6)
HGB BLD-MCNC: 15.3 G/DL (ref 12–15.9)
HOLD SPECIMEN: NORMAL
HOLD SPECIMEN: NORMAL
IMM GRANULOCYTES # BLD AUTO: 0.01 10*3/MM3 (ref 0–0.05)
IMM GRANULOCYTES NFR BLD AUTO: 0.1 % (ref 0–0.5)
INTERNAL NEGATIVE CONTROL: NEGATIVE
INTERNAL POSITIVE CONTROL: POSITIVE
LIPASE SERPL-CCNC: 25 U/L (ref 13–60)
LYMPHOCYTES # BLD AUTO: 2.31 10*3/MM3 (ref 0.7–3.1)
LYMPHOCYTES NFR BLD AUTO: 28.2 % (ref 19.6–45.3)
Lab: NORMAL
MCH RBC QN AUTO: 30.4 PG (ref 26.6–33)
MCHC RBC AUTO-ENTMCNC: 34 G/DL (ref 31.5–35.7)
MCV RBC AUTO: 89.3 FL (ref 79–97)
MONOCYTES # BLD AUTO: 0.85 10*3/MM3 (ref 0.1–0.9)
MONOCYTES NFR BLD AUTO: 10.4 % (ref 5–12)
NEUTROPHILS NFR BLD AUTO: 4.75 10*3/MM3 (ref 1.7–7)
NEUTROPHILS NFR BLD AUTO: 58 % (ref 42.7–76)
NRBC BLD AUTO-RTO: 0 /100 WBC (ref 0–0.2)
NT-PROBNP SERPL-MCNC: 32.4 PG/ML (ref 0–900)
PLATELET # BLD AUTO: 272 10*3/MM3 (ref 140–450)
PMV BLD AUTO: 8.7 FL (ref 6–12)
POTASSIUM SERPL-SCNC: 3.8 MMOL/L (ref 3.5–5.2)
PROT SERPL-MCNC: 6.8 G/DL (ref 6–8.5)
RBC # BLD AUTO: 5.04 10*6/MM3 (ref 3.77–5.28)
SODIUM SERPL-SCNC: 141 MMOL/L (ref 136–145)
TROPONIN T SERPL-MCNC: <0.01 NG/ML (ref 0–0.03)
TROPONIN T SERPL-MCNC: <0.01 NG/ML (ref 0–0.03)
WBC # BLD AUTO: 8.19 10*3/MM3 (ref 3.4–10.8)
WHOLE BLOOD HOLD SPECIMEN: NORMAL
WHOLE BLOOD HOLD SPECIMEN: NORMAL

## 2021-05-04 PROCEDURE — 99285 EMERGENCY DEPT VISIT HI MDM: CPT

## 2021-05-04 PROCEDURE — 83880 ASSAY OF NATRIURETIC PEPTIDE: CPT

## 2021-05-04 PROCEDURE — 84484 ASSAY OF TROPONIN QUANT: CPT

## 2021-05-04 PROCEDURE — 84484 ASSAY OF TROPONIN QUANT: CPT | Performed by: EMERGENCY MEDICINE

## 2021-05-04 PROCEDURE — 81025 URINE PREGNANCY TEST: CPT | Performed by: EMERGENCY MEDICINE

## 2021-05-04 PROCEDURE — 93005 ELECTROCARDIOGRAM TRACING: CPT

## 2021-05-04 PROCEDURE — 85025 COMPLETE CBC W/AUTO DIFF WBC: CPT

## 2021-05-04 PROCEDURE — 83690 ASSAY OF LIPASE: CPT

## 2021-05-04 PROCEDURE — 80053 COMPREHEN METABOLIC PANEL: CPT

## 2021-05-04 PROCEDURE — 71045 X-RAY EXAM CHEST 1 VIEW: CPT

## 2021-05-04 PROCEDURE — 93005 ELECTROCARDIOGRAM TRACING: CPT | Performed by: EMERGENCY MEDICINE

## 2021-05-04 RX ORDER — ASPIRIN 81 MG/1
324 TABLET, CHEWABLE ORAL ONCE
Status: COMPLETED | OUTPATIENT
Start: 2021-05-04 | End: 2021-05-04

## 2021-05-04 RX ORDER — SODIUM CHLORIDE 0.9 % (FLUSH) 0.9 %
10 SYRINGE (ML) INJECTION AS NEEDED
Status: DISCONTINUED | OUTPATIENT
Start: 2021-05-04 | End: 2021-05-05 | Stop reason: HOSPADM

## 2021-05-04 RX ADMIN — ASPIRIN 324 MG: 81 TABLET, CHEWABLE ORAL at 22:18

## 2021-05-05 LAB — HOLD SPECIMEN: NORMAL

## 2021-05-17 ENCOUNTER — OFFICE VISIT (OUTPATIENT)
Dept: CARDIOLOGY | Facility: HOSPITAL | Age: 51
End: 2021-05-17

## 2021-05-17 VITALS
BODY MASS INDEX: 30.21 KG/M2 | SYSTOLIC BLOOD PRESSURE: 136 MMHG | RESPIRATION RATE: 16 BRPM | DIASTOLIC BLOOD PRESSURE: 67 MMHG | HEART RATE: 81 BPM | HEIGHT: 61 IN | OXYGEN SATURATION: 98 % | TEMPERATURE: 97.8 F | WEIGHT: 160 LBS

## 2021-05-17 DIAGNOSIS — I10 ESSENTIAL HYPERTENSION: ICD-10-CM

## 2021-05-17 DIAGNOSIS — K21.9 GASTROESOPHAGEAL REFLUX DISEASE, UNSPECIFIED WHETHER ESOPHAGITIS PRESENT: ICD-10-CM

## 2021-05-17 DIAGNOSIS — Z82.49 FAMILY HISTORY OF PREMATURE CAD: ICD-10-CM

## 2021-05-17 DIAGNOSIS — R07.9 CHEST PAIN, UNSPECIFIED TYPE: Primary | ICD-10-CM

## 2021-05-17 PROCEDURE — 99214 OFFICE O/P EST MOD 30 MIN: CPT | Performed by: NURSE PRACTITIONER

## 2021-05-17 RX ORDER — PANTOPRAZOLE SODIUM 40 MG/1
40 TABLET, DELAYED RELEASE ORAL DAILY
Qty: 30 TABLET | Refills: 0 | Status: SHIPPED | OUTPATIENT
Start: 2021-05-17 | End: 2022-04-21 | Stop reason: SDUPTHER

## 2021-05-17 RX ORDER — PANTOPRAZOLE SODIUM 40 MG/1
40 TABLET, DELAYED RELEASE ORAL DAILY
Qty: 90 TABLET | OUTPATIENT
Start: 2021-05-17

## 2021-05-25 LAB
QT INTERVAL: 346 MS
QT INTERVAL: 354 MS
QTC INTERVAL: 406 MS
QTC INTERVAL: 411 MS

## 2021-06-01 ENCOUNTER — HOSPITAL ENCOUNTER (OUTPATIENT)
Dept: CARDIOLOGY | Facility: HOSPITAL | Age: 51
End: 2021-06-01

## 2021-06-14 RX ORDER — PANTOPRAZOLE SODIUM 40 MG/1
40 TABLET, DELAYED RELEASE ORAL DAILY
Qty: 30 TABLET | Refills: 0 | OUTPATIENT
Start: 2021-06-14

## 2021-06-26 DIAGNOSIS — I10 ESSENTIAL HYPERTENSION: ICD-10-CM

## 2021-06-28 RX ORDER — LOSARTAN POTASSIUM 50 MG/1
50 TABLET ORAL DAILY
Qty: 90 TABLET | Refills: 0 | Status: SHIPPED | OUTPATIENT
Start: 2021-06-28 | End: 2021-11-02

## 2021-07-01 ENCOUNTER — OFFICE VISIT (OUTPATIENT)
Dept: FAMILY MEDICINE CLINIC | Facility: CLINIC | Age: 51
End: 2021-07-01

## 2021-07-01 VITALS
HEIGHT: 61 IN | WEIGHT: 157 LBS | HEART RATE: 80 BPM | RESPIRATION RATE: 18 BRPM | SYSTOLIC BLOOD PRESSURE: 122 MMHG | OXYGEN SATURATION: 97 % | DIASTOLIC BLOOD PRESSURE: 80 MMHG | TEMPERATURE: 97.1 F | BODY MASS INDEX: 29.64 KG/M2

## 2021-07-01 DIAGNOSIS — R93.89 ABNORMAL CXR: ICD-10-CM

## 2021-07-01 DIAGNOSIS — Z12.31 ENCOUNTER FOR SCREENING MAMMOGRAM FOR MALIGNANT NEOPLASM OF BREAST: ICD-10-CM

## 2021-07-01 DIAGNOSIS — I10 ESSENTIAL HYPERTENSION: Primary | ICD-10-CM

## 2021-07-01 DIAGNOSIS — F33.41 RECURRENT MAJOR DEPRESSIVE DISORDER, IN PARTIAL REMISSION (HCC): ICD-10-CM

## 2021-07-01 PROCEDURE — 99213 OFFICE O/P EST LOW 20 MIN: CPT | Performed by: FAMILY MEDICINE

## 2021-07-01 RX ORDER — DESVENLAFAXINE SUCCINATE 50 MG/1
50 TABLET, EXTENDED RELEASE ORAL DAILY
Qty: 90 TABLET | Refills: 1 | Status: SHIPPED | OUTPATIENT
Start: 2021-07-01 | End: 2022-01-24

## 2021-07-01 NOTE — PROGRESS NOTES
"Chief Complaint  Establish Care (Transferring from Dr Giron in [place] )    Subjective          Deysi Valentine presents to [facility]  Hypertension  This is a chronic problem. The current episode started more than 1 year ago. The problem is controlled. Pertinent negatives include no blurred vision, headaches, orthopnea, palpitations, peripheral edema or shortness of breath. There are no associated agents to hypertension. Risk factors for coronary artery disease include family history. Past treatments include angiotensin blockers. Current antihypertension treatment includes angiotensin blockers. The current treatment provides significant improvement. There are no compliance problems.      She went to  ED in May 2021 for chest pain. CXR revealed 9 mm calcified granuloma     Mood is doing well with Pristiq, needs medication refill    The following portions of the patient's history were reviewed and updated as appropriate: allergies, current medications, past family history, past medical history, past social history, past surgical history and problem list.    Objective   Vital Signs:   /80   Pulse 80   Temp 97.1 °F (36.2 °C)   Resp 18   Ht 154.9 cm (61\")   Wt 71.2 kg (157 lb)   SpO2 97%   BMI 29.66 kg/m²     Physical Exam  Vitals and nursing note reviewed.   Constitutional:       Appearance: Normal appearance. She is well-developed.   HENT:      Head: Normocephalic and atraumatic.      Right Ear: Tympanic membrane, ear canal and external ear normal.      Left Ear: Tympanic membrane, ear canal and external ear normal.      Nose: Nose normal.   Eyes:      Conjunctiva/sclera: Conjunctivae normal.   Cardiovascular:      Rate and Rhythm: Normal rate and regular rhythm.      Heart sounds: Normal heart sounds. No murmur heard.     Pulmonary:      Effort: Pulmonary effort is normal.      Breath sounds: Normal breath sounds. No wheezing.   Musculoskeletal:         General: No " swelling or deformity.      Cervical back: Neck supple.   Skin:     General: Skin is warm and dry.   Neurological:      General: No focal deficit present.      Mental Status: She is alert and oriented to person, place, and time.   Psychiatric:         Mood and Affect: Mood normal.         Behavior: Behavior normal.         Thought Content: Thought content normal.        Result Review :                 Assessment and Plan    Diagnoses and all orders for this visit:    1. Essential hypertension (Primary)    2. Recurrent major depressive disorder, in partial remission (CMS/HCC)  -     desvenlafaxine (PRISTIQ) 50 MG 24 hr tablet; Take 1 tablet by mouth Daily.  Dispense: 90 tablet; Refill: 1    3. Abnormal CXR  -     CT Chest Without Contrast    4. Encounter for screening mammogram for malignant neoplasm of breast  -     Mammo Screening Digital Tomosynthesis Bilateral With CAD    Chronic conditions are stable.  She recently updated routine labs in March 2021.   We will follow up with CT chest to evaluate changes that were seen on chest x-ray      Follow Up   Return in about 6 months (around 1/1/2022) for Recheck.  Patient was given instructions and counseling regarding her condition or for health maintenance advice. Please see specific information pulled into the AVS if appropriate.

## 2021-07-01 NOTE — PATIENT INSTRUCTIONS
Go to the nearest ER or return to clinic if symptoms worsen, fever/chill develop    Hypertension, Adult  Hypertension is another name for high blood pressure. High blood pressure forces your heart to work harder to pump blood. This can cause problems over time.  There are two numbers in a blood pressure reading. There is a top number (systolic) over a bottom number (diastolic). It is best to have a blood pressure that is below 120/80. Healthy choices can help lower your blood pressure, or you may need medicine to help lower it.  What are the causes?  The cause of this condition is not known. Some conditions may be related to high blood pressure.  What increases the risk?  · Smoking.  · Having type 2 diabetes mellitus, high cholesterol, or both.  · Not getting enough exercise or physical activity.  · Being overweight.  · Having too much fat, sugar, calories, or salt (sodium) in your diet.  · Drinking too much alcohol.  · Having long-term (chronic) kidney disease.  · Having a family history of high blood pressure.  · Age. Risk increases with age.  · Race. You may be at higher risk if you are .  · Gender. Men are at higher risk than women before age 45. After age 65, women are at higher risk than men.  · Having obstructive sleep apnea.  · Stress.  What are the signs or symptoms?  · High blood pressure may not cause symptoms. Very high blood pressure (hypertensive crisis) may cause:  ? Headache.  ? Feelings of worry or nervousness (anxiety).  ? Shortness of breath.  ? Nosebleed.  ? A feeling of being sick to your stomach (nausea).  ? Throwing up (vomiting).  ? Changes in how you see.  ? Very bad chest pain.  ? Seizures.  How is this treated?  · This condition is treated by making healthy lifestyle changes, such as:  ? Eating healthy foods.  ? Exercising more.  ? Drinking less alcohol.  · Your health care provider may prescribe medicine if lifestyle changes are not enough to get your blood pressure under  control, and if:  ? Your top number is above 130.  ? Your bottom number is above 80.  · Your personal target blood pressure may vary.  Follow these instructions at home:  Eating and drinking    · If told, follow the DASH eating plan. To follow this plan:  ? Fill one half of your plate at each meal with fruits and vegetables.  ? Fill one fourth of your plate at each meal with whole grains. Whole grains include whole-wheat pasta, brown rice, and whole-grain bread.  ? Eat or drink low-fat dairy products, such as skim milk or low-fat yogurt.  ? Fill one fourth of your plate at each meal with low-fat (lean) proteins. Low-fat proteins include fish, chicken without skin, eggs, beans, and tofu.  ? Avoid fatty meat, cured and processed meat, or chicken with skin.  ? Avoid pre-made or processed food.  · Eat less than 1,500 mg of salt each day.  · Do not drink alcohol if:  ? Your doctor tells you not to drink.  ? You are pregnant, may be pregnant, or are planning to become pregnant.  · If you drink alcohol:  ? Limit how much you use to:  § 0-1 drink a day for women.  § 0-2 drinks a day for men.  ? Be aware of how much alcohol is in your drink. In the [place] one drink equals one 12 oz bottle of beer (355 mL), one 5 oz glass of wine (148 mL), or one 1½ oz glass of hard liquor (44 mL).  Lifestyle    · Work with your doctor to stay at a healthy weight or to lose weight. Ask your doctor what the best weight is for you.  · Get at least 30 minutes of exercise most days of the week. This may include walking, swimming, or biking.  · Get at least 30 minutes of exercise that strengthens your muscles (resistance exercise) at least 3 days a week. This may include lifting weights or doing Pilates.  · Do not use any products that contain nicotine or tobacco, such as cigarettes, e-cigarettes, and chewing tobacco. If you need help quitting, ask your doctor.  · Check your blood pressure at home as told by your doctor.  · Keep all follow-up  visits as told by your doctor. This is important.  Medicines  · Take over-the-counter and prescription medicines only as told by your doctor. Follow directions carefully.  · Do not skip doses of blood pressure medicine. The medicine does not work as well if you skip doses. Skipping doses also puts you at risk for problems.  · Ask your doctor about side effects or reactions to medicines that you should watch for.  Contact a doctor if you:  · Think you are having a reaction to the medicine you are taking.  · Have headaches that keep coming back (recurring).  · Feel dizzy.  · Have swelling in your ankles.  · Have trouble with your vision.  Get help right away if you:  · Get a very bad headache.  · Start to feel mixed up (confused).  · Feel weak or numb.  · Feel faint.  · Have very bad pain in your:  ? Chest.  ? Belly (abdomen).  · Throw up more than once.  · Have trouble breathing.  Summary  · Hypertension is another name for high blood pressure.  · High blood pressure forces your heart to work harder to pump blood.  · For most people, a normal blood pressure is less than 120/80.  · Making healthy choices can help lower blood pressure. If your blood pressure does not get lower with healthy choices, you may need to take medicine.  This information is not intended to replace advice given to you by your health care provider. Make sure you discuss any questions you have with your health care provider.  Document Revised: 08/28/2019 Document Reviewed: 08/28/2019  [employer] Patient Education © 2021 [employer]

## 2021-07-08 ENCOUNTER — PATIENT ROUNDING (BHMG ONLY) (OUTPATIENT)
Dept: FAMILY MEDICINE CLINIC | Facility: CLINIC | Age: 51
End: 2021-07-08

## 2021-07-27 ENCOUNTER — HOSPITAL ENCOUNTER (OUTPATIENT)
Dept: MAMMOGRAPHY | Facility: HOSPITAL | Age: 51
Discharge: HOME | End: 2021-07-27

## 2021-07-27 ENCOUNTER — APPOINTMENT (OUTPATIENT)
Dept: OTHER | Facility: HOSPITAL | Age: 51
End: 2021-07-27

## 2021-07-27 ENCOUNTER — HOSPITAL ENCOUNTER (OUTPATIENT)
Dept: CT IMAGING | Facility: HOSPITAL | Age: 51
Discharge: HOME | End: 2021-07-27

## 2021-07-27 DIAGNOSIS — Z12.31 ENCOUNTER FOR SCREENING MAMMOGRAM FOR MALIGNANT NEOPLASM OF BREAST: ICD-10-CM

## 2021-07-27 PROCEDURE — 71250 CT THORAX DX C-: CPT

## 2021-07-30 DIAGNOSIS — R91.1 LUNG NODULE: Primary | ICD-10-CM

## 2021-10-05 ENCOUNTER — HOSPITAL ENCOUNTER (OUTPATIENT)
Dept: MAMMOGRAPHY | Facility: HOSPITAL | Age: 51
Discharge: HOME | End: 2021-10-05
Admitting: FAMILY MEDICINE

## 2021-10-05 ENCOUNTER — APPOINTMENT (OUTPATIENT)
Dept: OTHER | Facility: HOSPITAL | Age: 51
End: 2021-10-05

## 2021-10-05 DIAGNOSIS — Z12.31 ENCOUNTER FOR SCREENING MAMMOGRAM FOR MALIGNANT NEOPLASM OF BREAST: ICD-10-CM

## 2021-10-05 PROCEDURE — 77063 BREAST TOMOSYNTHESIS BI: CPT

## 2021-10-05 PROCEDURE — 77067 SCR MAMMO BI INCL CAD: CPT

## 2021-10-05 PROCEDURE — 77067 SCR MAMMO BI INCL CAD: CPT | Performed by: RADIOLOGY

## 2021-10-05 PROCEDURE — 77063 BREAST TOMOSYNTHESIS BI: CPT | Performed by: RADIOLOGY

## 2021-11-02 DIAGNOSIS — I10 ESSENTIAL HYPERTENSION: ICD-10-CM

## 2021-11-02 RX ORDER — LOSARTAN POTASSIUM 50 MG/1
50 TABLET ORAL DAILY
Qty: 90 TABLET | Refills: 0 | Status: SHIPPED | OUTPATIENT
Start: 2021-11-02 | End: 2022-04-21

## 2021-11-10 ENCOUNTER — TELEPHONE (OUTPATIENT)
Dept: FAMILY MEDICINE CLINIC | Facility: CLINIC | Age: 51
End: 2021-11-10

## 2021-11-11 NOTE — TELEPHONE ENCOUNTER
Please schedule an appointment. If heavy bleeding is still present, please advise her to go to ER.

## 2021-11-11 NOTE — TELEPHONE ENCOUNTER
Regarding: Having a problem   ----- Message from Lanie Zee MA sent at 11/10/2021  9:24 AM EST -----       ----- Message from Deysi Valentine to Jennifer Chaves DO sent at 11/10/2021  8:52 AM -----   Good Morning Dr Mcleod     I am having a very heavy menstrual period right now, with cramping.   I went without a period from June until about mid October. I have been bleeding lightly since then, however yesterday it became more like a hemorrhage with blood clots.     I was thinking there for awhile I may be in menopause, but it looks that I am still in savi menopause.   I do have a fibroid and there is an ultrasound in My Chart from around January 2019, I believe.   I was supposed to have one last March, however with COVID, it was pushed aside and I never had it rescheduled and I moved earlier this year from [place] to [place] / [place]   Thank you in Advance for your help ,   Deysi Valentine     I am at work today.

## 2022-01-23 DIAGNOSIS — F33.41 RECURRENT MAJOR DEPRESSIVE DISORDER, IN PARTIAL REMISSION: ICD-10-CM

## 2022-01-24 RX ORDER — DESVENLAFAXINE SUCCINATE 50 MG/1
50 TABLET, EXTENDED RELEASE ORAL DAILY
Qty: 30 TABLET | Refills: 0 | Status: SHIPPED | OUTPATIENT
Start: 2022-01-24 | End: 2022-02-28

## 2022-02-25 DIAGNOSIS — F33.41 RECURRENT MAJOR DEPRESSIVE DISORDER, IN PARTIAL REMISSION: ICD-10-CM

## 2022-02-26 DIAGNOSIS — F33.41 RECURRENT MAJOR DEPRESSIVE DISORDER, IN PARTIAL REMISSION: ICD-10-CM

## 2022-02-28 RX ORDER — DESVENLAFAXINE SUCCINATE 50 MG/1
50 TABLET, EXTENDED RELEASE ORAL DAILY
Qty: 30 TABLET | Refills: 0 | OUTPATIENT
Start: 2022-02-28

## 2022-02-28 RX ORDER — DESVENLAFAXINE SUCCINATE 50 MG/1
TABLET, EXTENDED RELEASE ORAL
Qty: 30 TABLET | Refills: 0 | Status: SHIPPED | OUTPATIENT
Start: 2022-02-28 | End: 2022-03-29 | Stop reason: SDUPTHER

## 2022-03-29 DIAGNOSIS — F33.41 RECURRENT MAJOR DEPRESSIVE DISORDER, IN PARTIAL REMISSION: ICD-10-CM

## 2022-03-29 RX ORDER — DESVENLAFAXINE SUCCINATE 50 MG/1
50 TABLET, EXTENDED RELEASE ORAL DAILY
Qty: 30 TABLET | Refills: 0 | Status: SHIPPED | OUTPATIENT
Start: 2022-03-29 | End: 2022-04-04 | Stop reason: SDUPTHER

## 2022-04-04 DIAGNOSIS — F33.41 RECURRENT MAJOR DEPRESSIVE DISORDER, IN PARTIAL REMISSION: ICD-10-CM

## 2022-04-04 RX ORDER — DESVENLAFAXINE SUCCINATE 50 MG/1
50 TABLET, EXTENDED RELEASE ORAL DAILY
Qty: 90 TABLET | Refills: 0 | Status: SHIPPED | OUTPATIENT
Start: 2022-04-04 | End: 2022-04-21 | Stop reason: SDUPTHER

## 2022-04-21 ENCOUNTER — OFFICE VISIT (OUTPATIENT)
Dept: FAMILY MEDICINE CLINIC | Facility: CLINIC | Age: 52
End: 2022-04-21

## 2022-04-21 VITALS
HEIGHT: 61 IN | TEMPERATURE: 97.4 F | RESPIRATION RATE: 16 BRPM | DIASTOLIC BLOOD PRESSURE: 84 MMHG | SYSTOLIC BLOOD PRESSURE: 118 MMHG | WEIGHT: 136 LBS | BODY MASS INDEX: 25.68 KG/M2 | OXYGEN SATURATION: 98 % | HEART RATE: 94 BPM

## 2022-04-21 DIAGNOSIS — Z00.00 ANNUAL PHYSICAL EXAM: ICD-10-CM

## 2022-04-21 DIAGNOSIS — E55.9 VITAMIN D DEFICIENCY: ICD-10-CM

## 2022-04-21 DIAGNOSIS — K21.9 GASTROESOPHAGEAL REFLUX DISEASE, UNSPECIFIED WHETHER ESOPHAGITIS PRESENT: ICD-10-CM

## 2022-04-21 DIAGNOSIS — F33.41 RECURRENT MAJOR DEPRESSIVE DISORDER, IN PARTIAL REMISSION: Primary | ICD-10-CM

## 2022-04-21 DIAGNOSIS — E78.00 HIGH CHOLESTEROL: ICD-10-CM

## 2022-04-21 DIAGNOSIS — Z11.59 ENCOUNTER FOR HEPATITIS C SCREENING TEST FOR LOW RISK PATIENT: ICD-10-CM

## 2022-04-21 PROCEDURE — 99396 PREV VISIT EST AGE 40-64: CPT | Performed by: FAMILY MEDICINE

## 2022-04-21 RX ORDER — DESVENLAFAXINE SUCCINATE 50 MG/1
50 TABLET, EXTENDED RELEASE ORAL DAILY
Qty: 90 TABLET | Refills: 3 | Status: SHIPPED | OUTPATIENT
Start: 2022-04-21

## 2022-04-21 RX ORDER — PANTOPRAZOLE SODIUM 40 MG/1
40 TABLET, DELAYED RELEASE ORAL DAILY
Qty: 90 TABLET | Refills: 0 | Status: SHIPPED | OUTPATIENT
Start: 2022-04-21

## 2022-04-21 NOTE — PROGRESS NOTES
"Chief Complaint  Annual Exam    Subjective          Deysi Valentine presents to [facility]  History of Present Illness  Here for annual exam   Dental exam: up to date  Vision exam: overdue  Colonoscopy: 2017, repeat in 10 years  Mammogram: October 2021  Pap smear: March 2020  Mood is responding well to Pristiq, dose does not need to be adjusted.  She does experience acid reflux, not treating with anything at this time.  She has taken pantoprazole in the past that has resolved symptoms.  She has been losing weight with [employer] diet plan. She is still following calorie restriction to maintain weight loss.    The following portions of the patient's history were reviewed and updated as appropriate: allergies, current medications, past family history, past medical history, past social history, past surgical history and problem list.    Objective   Vital Signs:   /84   Pulse 94   Temp 97.4 °F (36.3 °C)   Resp 16   Ht 154.9 cm (61\")   Wt 61.7 kg (136 lb)   SpO2 98%   BMI 25.70 kg/m²     Physical Exam  Vitals and nursing note reviewed.   Constitutional:       Appearance: Normal appearance. She is well-developed.   HENT:      Head: Normocephalic and atraumatic.      Right Ear: Tympanic membrane, ear canal and external ear normal.      Left Ear: Tympanic membrane, ear canal and external ear normal.   Eyes:      Conjunctiva/sclera: Conjunctivae normal.   Cardiovascular:      Rate and Rhythm: Normal rate and regular rhythm.      Heart sounds: Normal heart sounds. No murmur heard.  Pulmonary:      Effort: Pulmonary effort is normal.      Breath sounds: Normal breath sounds. No wheezing.   Musculoskeletal:         General: No deformity.      Cervical back: Normal range of motion and neck supple.   Lymphadenopathy:      Cervical: No cervical adenopathy.   Skin:     General: Skin is warm.   Neurological:      General: No focal deficit present.      Mental Status: She is alert and " oriented to person, place, and time.   Psychiatric:         Mood and Affect: Mood normal.         Behavior: Behavior normal.         Thought Content: Thought content normal.        Result Review :                 Assessment and Plan    Diagnoses and all orders for this visit:    1. Recurrent major depressive disorder, in partial remission (HCC) (Primary)  Comments:  Mood is doing well with Pristiq, no changes  Orders:  -     desvenlafaxine (PRISTIQ) 50 MG 24 hr tablet; Take 1 tablet by mouth Daily.  Dispense: 90 tablet; Refill: 3  -     CBC & Differential; Future  -     Comprehensive Metabolic Panel; Future  -     TSH Rfx On Abnormal To Free T4; Future    2. Vitamin D deficiency  -     CBC & Differential; Future  -     Comprehensive Metabolic Panel; Future  -     TSH Rfx On Abnormal To Free T4; Future  -     Vitamin D 25 Hydroxy; Future    3. High cholesterol  Comments:  Return to complete FLP  Orders:  -     CBC & Differential; Future  -     Comprehensive Metabolic Panel; Future  -     Lipid Panel With / Chol / HDL Ratio; Future  -     TSH Rfx On Abnormal To Free T4; Future    4. Gastroesophageal reflux disease, unspecified whether esophagitis present  Comments:  Prn PPI to resolve symptoms   Orders:  -     pantoprazole (PROTONIX) 40 MG EC tablet; Take 1 tablet by mouth Daily.  Dispense: 90 tablet; Refill: 0    5. Encounter for hepatitis C screening test for low risk patient  -     Hepatitis C Antibody; Future    6. Annual physical exam  -     CBC & Differential; Future  -     Comprehensive Metabolic Panel; Future  -     Lipid Panel With / Chol / HDL Ratio; Future  -     Hepatitis C Antibody; Future  -     TSH Rfx On Abnormal To Free T4; Future  -     Vitamin D 25 Hydroxy; Future    Health advice: healthy food choices with fresh fruits and vegetables, maintain sleep pattern at least 8 hours, avoid texting and distracted driving practices; wear safety belt, engage in regular exercise, maintain healthy weight, use  safe sex practices, avoid alcohol and illicit drugs. Maintain immunizations that are up to date. Maintain health maintenance: Mammogram, Pap, etc.  Follow up with PCP if struggling with depression or anxiety. Keep regular dental and eye exams. Brush and floss teeth daily.   F/U annually and prn.         Follow Up   Return in about 1 year (around 4/21/2023) for Annual.  Patient was given instructions and counseling regarding her condition or for health maintenance advice. Please see specific information pulled into the AVS if appropriate.

## 2022-08-12 ENCOUNTER — TELEPHONE (OUTPATIENT)
Dept: FAMILY MEDICINE CLINIC | Facility: CLINIC | Age: 52
End: 2022-08-12

## 2022-08-12 NOTE — TELEPHONE ENCOUNTER
SHE THINKS SHE HAS A BLADDER INFECTION SYMPTOMS ARE FREQ URINATION, BURNING, LITTLE ODOR IS TAKING AZO TO TREAT THE BURNING.  WANTS TO KNOW CAN SHE HAVE AN ANTIBIOTIC CALLED IN SHES HAD THEM IN THE PAST AND USUALLY A SULF ANTIBIOTIC WORKS BEST FOR HER    [place]

## 2022-10-07 ENCOUNTER — TRANSCRIBE ORDERS (OUTPATIENT)
Dept: ADMINISTRATIVE | Facility: HOSPITAL | Age: 52
End: 2022-10-07

## 2022-10-07 DIAGNOSIS — Z12.31 VISIT FOR SCREENING MAMMOGRAM: Primary | ICD-10-CM

## 2022-11-28 ENCOUNTER — HOSPITAL ENCOUNTER (OUTPATIENT)
Dept: MAMMOGRAPHY | Facility: HOSPITAL | Age: 52
Discharge: HOME | End: 2022-11-28
Admitting: FAMILY MEDICINE

## 2022-11-28 DIAGNOSIS — Z12.31 VISIT FOR SCREENING MAMMOGRAM: ICD-10-CM

## 2022-11-28 PROCEDURE — 77067 SCR MAMMO BI INCL CAD: CPT

## 2022-11-28 PROCEDURE — 77063 BREAST TOMOSYNTHESIS BI: CPT | Performed by: RADIOLOGY

## 2022-11-28 PROCEDURE — 77063 BREAST TOMOSYNTHESIS BI: CPT

## 2022-11-28 PROCEDURE — 77067 SCR MAMMO BI INCL CAD: CPT | Performed by: RADIOLOGY

## 2023-04-24 ENCOUNTER — OFFICE VISIT (OUTPATIENT)
Dept: FAMILY MEDICINE CLINIC | Facility: CLINIC | Age: 53
End: 2023-04-24
Payer: COMMERCIAL

## 2023-04-24 VITALS
HEIGHT: 61 IN | TEMPERATURE: 98.2 F | SYSTOLIC BLOOD PRESSURE: 124 MMHG | RESPIRATION RATE: 14 BRPM | DIASTOLIC BLOOD PRESSURE: 80 MMHG | BODY MASS INDEX: 28.4 KG/M2 | HEART RATE: 74 BPM | OXYGEN SATURATION: 95 % | WEIGHT: 150.4 LBS

## 2023-04-24 DIAGNOSIS — K21.9 GASTROESOPHAGEAL REFLUX DISEASE, UNSPECIFIED WHETHER ESOPHAGITIS PRESENT: ICD-10-CM

## 2023-04-24 DIAGNOSIS — F33.41 RECURRENT MAJOR DEPRESSIVE DISORDER, IN PARTIAL REMISSION: ICD-10-CM

## 2023-04-24 DIAGNOSIS — E78.00 HIGH CHOLESTEROL: ICD-10-CM

## 2023-04-24 DIAGNOSIS — E55.9 VITAMIN D DEFICIENCY: ICD-10-CM

## 2023-04-24 DIAGNOSIS — K90.0 CELIAC DISEASE: ICD-10-CM

## 2023-04-24 DIAGNOSIS — Z00.00 ANNUAL PHYSICAL EXAM: Primary | ICD-10-CM

## 2023-04-24 DIAGNOSIS — Z12.4 PAP SMEAR FOR CERVICAL CANCER SCREENING: ICD-10-CM

## 2023-04-24 DIAGNOSIS — Z23 NEED FOR SHINGLES VACCINE: ICD-10-CM

## 2023-04-24 DIAGNOSIS — Z23 NEED FOR TDAP VACCINATION: ICD-10-CM

## 2023-04-24 RX ORDER — PANTOPRAZOLE SODIUM 40 MG/1
40 TABLET, DELAYED RELEASE ORAL DAILY
Qty: 90 TABLET | Refills: 1 | Status: SHIPPED | OUTPATIENT
Start: 2023-04-24

## 2023-04-24 RX ORDER — DESVENLAFAXINE SUCCINATE 50 MG/1
50 TABLET, EXTENDED RELEASE ORAL DAILY
Qty: 90 TABLET | Refills: 3 | Status: SHIPPED | OUTPATIENT
Start: 2023-04-24

## 2023-04-24 NOTE — PROGRESS NOTES
Chief Complaint  Annual Exam (Pt is fasting. No pap.)    Subjective          Deysi Valentine presents to [facility]  History of Present Illness  Here for annual exam    She is utilizing [employer] to help with weight loss.     Mammogram: November 2022  Pap smear: March 2020, due  Colonoscopy: April 2017, repeat in 10 years  LMP: Dec 2021    She has Celiac disease, follows a gluten free diet. She is not established with gastroenterology at this time.     The following portions of the patient's history were reviewed and updated as appropriate: allergies, current medications, past family history, past medical history, past social history, past surgical history and problem list.    Objective      Physical Exam  Vitals reviewed.   Constitutional:       Appearance: She is well-developed.   HENT:      Right Ear: Tympanic membrane, ear canal and external ear normal.      Left Ear: Tympanic membrane, ear canal and external ear normal.   Cardiovascular:      Rate and Rhythm: Normal rate and regular rhythm.      Heart sounds: Normal heart sounds.   Pulmonary:      Effort: Pulmonary effort is normal.      Breath sounds: Normal breath sounds.   Chest:   Breasts:     Right: Normal. No bleeding, inverted nipple, mass or nipple discharge.      Left: Normal. No bleeding, inverted nipple, mass or nipple discharge.   Genitourinary:     General: Normal vulva.      Labia:         Right: No lesion.         Left: No lesion.       Vagina: Normal.      Cervix: Normal.      Uterus: Normal.       Adnexa: Right adnexa normal and left adnexa normal.   Musculoskeletal:      Cervical back: Neck supple.   Lymphadenopathy:      Upper Body:      Right upper body: No supraclavicular, axillary or pectoral adenopathy.      Left upper body: No supraclavicular, axillary or pectoral adenopathy.   Neurological:      Mental Status: She is alert.   Psychiatric:         Mood and Affect: Mood normal.         Behavior:  Behavior normal.        Result Review :                Assessment and Plan    Diagnoses and all orders for this visit:    1. Annual physical exam (Primary)  -     Comprehensive Metabolic Panel  -     LIQUID-BASED PAP SMEAR, [facility] ([facility]); Future    2. High cholesterol  -     Comprehensive Metabolic Panel  -     Lipid Panel With / Chol / HDL Ratio    3. Vitamin D deficiency  -     Comprehensive Metabolic Panel  -     Vitamin D,25-Hydroxy    4. Celiac disease  Comments:  She would like to establish care with gastroenterology   Orders:  -     Comprehensive Metabolic Panel  -     Ambulatory Referral to Gastroenterology    5. Recurrent major depressive disorder, in partial remission  Comments:  Mood is doing well with Pristiq, no changes  Orders:  -     desvenlafaxine (PRISTIQ) 50 MG 24 hr tablet; Take 1 tablet by mouth Daily.  Dispense: 90 tablet; Refill: 3    6. Gastroesophageal reflux disease, unspecified whether esophagitis present  Comments:  Prn PPI to resolve symptoms   Orders:  -     pantoprazole (PROTONIX) 40 MG EC tablet; Take 1 tablet by mouth Daily.  Dispense: 90 tablet; Refill: 1    7. Need for shingles vaccine  -     Zoster Vac Recomb Adjuvanted 50 MCG/0.5ML reconstituted suspension; Inject 0.5 mL into the appropriate muscle as directed by prescriber 1 (One) Time for 1 dose.  Dispense: 1 each; Refill: 0    8. Need for Tdap vaccination  -     Tdap Vaccine Greater Than or Equal To 6yo IM    9. Pap smear for cervical cancer screening  -     LIQUID-BASED PAP SMEAR, [facility] ([facility]); Future    Pap smear completed today.    Health advice: healthy food choices with fresh fruits and vegetables, maintain sleep pattern at least 8 hours, avoid texting and distracted driving practices; wear safety belt, engage in regular exercise, maintain healthy weight, use safe sex practices, avoid alcohol and illicit drugs. Maintain immunizations that are up to date. Maintain health maintenance: Mammogram, Pap, etc.   Follow up with PCP if struggling with depression or anxiety. Keep regular dental and eye exams. Brush and floss teeth daily.   F/U annually and prn.         Follow Up   No follow-ups on file.  Patient was given instructions and counseling regarding her condition or for health maintenance advice. Please see specific information pulled into the AVS if appropriate.

## 2023-04-25 LAB
25(OH)D3+25(OH)D2 SERPL-MCNC: 70.6 NG/ML (ref 30–100)
ALBUMIN SERPL-MCNC: 4.1 G/DL (ref 3.5–5.2)
ALBUMIN/GLOB SERPL: 1.6 G/DL
ALP SERPL-CCNC: 118 U/L (ref 39–117)
ALT SERPL-CCNC: 32 U/L (ref 1–33)
AST SERPL-CCNC: 21 U/L (ref 1–32)
BILIRUB SERPL-MCNC: 0.4 MG/DL (ref 0–1.2)
BUN SERPL-MCNC: 13 MG/DL (ref 6–20)
BUN/CREAT SERPL: 18.1 (ref 7–25)
CALCIUM SERPL-MCNC: 9.5 MG/DL (ref 8.6–10.5)
CHLORIDE SERPL-SCNC: 107 MMOL/L (ref 98–107)
CHOLEST SERPL-MCNC: 186 MG/DL (ref 0–200)
CHOLEST/HDLC SERPL: 4.33 {RATIO}
CO2 SERPL-SCNC: 26.4 MMOL/L (ref 22–29)
CREAT SERPL-MCNC: 0.72 MG/DL (ref 0.57–1)
EGFRCR SERPLBLD CKD-EPI 2021: 100.1 ML/MIN/1.73
GLOBULIN SER CALC-MCNC: 2.5 GM/DL
GLUCOSE SERPL-MCNC: 85 MG/DL (ref 65–99)
HDLC SERPL-MCNC: 43 MG/DL (ref 40–60)
LDLC SERPL CALC-MCNC: 127 MG/DL (ref 0–100)
POTASSIUM SERPL-SCNC: 4.1 MMOL/L (ref 3.5–5.2)
PROT SERPL-MCNC: 6.6 G/DL (ref 6–8.5)
SODIUM SERPL-SCNC: 143 MMOL/L (ref 136–145)
TRIGL SERPL-MCNC: 84 MG/DL (ref 0–150)
VLDLC SERPL CALC-MCNC: 16 MG/DL (ref 5–40)

## 2023-04-27 LAB — REF LAB TEST METHOD: NORMAL

## 2023-09-14 NOTE — PROGRESS NOTES
"   Trauma Surgery   Progress Note  Admit Date: 9/10/2023  HD#4  POD#2 Days Post-Op    Subjective:   Interval history:  Afhs  H/H is stable  Patient has no complaints, but does endorse some post-operative pain to the bilateral wrists  She is tolerating PO intake without n/v  Patient has been voiding since grubbs was removed  She had a BM yesterday afternoon      Home Meds:   Current Outpatient Medications   Medication Instructions    omeprazole (PRILOSEC) 20 mg, Oral, Daily PRN    risperiDONE (RISPERDAL) 0.5 mg, Oral, 2 times daily    sertraline (ZOLOFT) 100 mg, Oral, Daily      Scheduled Meds:   acetaminophen  650 mg Oral Q4H    docusate sodium  100 mg Oral BID    hydrocortisone   Topical (Top) BID    methocarbamoL  750 mg Oral QID    mupirocin   Nasal BID    polyethylene glycol  17 g Oral BID    risperiDONE  0.5 mg Oral BID    sertraline  100 mg Oral Daily     Continuous Infusions:  PRN Meds:bisacodyL, diphenhydrAMINE, melatonin, morphine, oxyCODONE, oxyCODONE     Objective:     VITAL SIGNS: 24 HR MIN & MAX LAST   Temp  Min: 98.3 °F (36.8 °C)  Max: 99.5 °F (37.5 °C)  98.3 °F (36.8 °C)   BP  Min: 108/60  Max: 110/68  110/68    Pulse  Min: 88  Max: 103  93    Resp  Min: 16  Max: 20  20    SpO2  Min: 91 %  Max: 99 %  99 %      HT: 5' 3" (160 cm)  WT: 65.2 kg (143 lb 11.8 oz)  BMI: 25.5     Intake/output:  Intake/Output - Last 3 Shifts         09/12 0700 09/13 0659 09/13 0700 09/14 0659 09/14 0700  09/15 0659    P.O.  120 240    I.V. (mL/kg) 1786.3 (27.4)      IV Piggyback 700      Total Intake(mL/kg) 2486.3 (38.1) 120 (1.8) 240 (3.7)    Urine (mL/kg/hr) 3525 (2.3) 350 (0.2)     Blood 50      Total Output 3575 350     Net -1088.7 -230 +240           Urine Occurrence  1 x 1 x            Intake/Output Summary (Last 24 hours) at 9/14/2023 1447  Last data filed at 9/14/2023 0941  Gross per 24 hour   Intake 360 ml   Output --   Net 360 ml         Lines/drains/airway:       Peripheral IV - Single Lumen 09/10/23 2100 22 G " July 8, 2021    Hello, may I speak with Deysi Valentine?    My name is Marion Kervin, Practice Manager    I am  with [facility]  [facility]  [address] [facility] [address] [phone].    Before we get started may I verify your date of birth? 1970    I am calling to officially welcome you to our practice and ask about your recent visit. Is this a good time to talk? Yes    Tell me about your visit with us. What things went well?  Everything went great thank you.       We're always looking for ways to make our patients' experiences even better. Do you have recommendations on ways we may improve?  No    Overall were you satisfied with your first visit to our practice? Yes       I appreciate you taking the time to speak with me today. Is there anything else I can do for you? No    Thank you, and have a great day.       "Anterior;Left Shoulder (Active)   Site Assessment Clean;Dry;Intact;No redness;No swelling 09/13/23 0800   Extremity Assessment Distal to IV No abnormal discoloration;No redness;No swelling;No warmth 09/13/23 0800   Line Status Infusing 09/13/23 0800   Dressing Status Clean;Dry;Intact 09/13/23 0800   Dressing Intervention Integrity maintained 09/13/23 0800   Number of days: 2            Urethral Catheter 09/10/23 2048 Temperature probe 16 Fr. (Active)   Site Assessment Clean;Intact;Dry 09/13/23 0700   Collection Container Urimeter 09/13/23 0700   Securement Method secured to top of thigh w/ adhesive device 09/13/23 0700   Catheter Care Performed yes 09/13/23 0700   Reason for Continuing Urinary Catheterization Critically ill in ICU and requiring hourly monitoring of intake/output 09/13/23 0700   CAUTI Prevention Bundle Securement Device in place with 1" slack;Intact seal between catheter & drainage tubing;Drainage bag/urimeter off the floor;Sheeting clip in use;No dependent loops or kinks;Drainage bag/urimeter not overfilled (<2/3 full);Drainage bag/urimeter below bladder 09/13/23 0700   Output (mL) 250 mL 09/13/23 0614   Number of days: 2       Physical examination:  Gen: NAD, AAOx3, answering questions appropriately  HEENT: NCAT  CV: RR  Resp: NWOB  Abd: S/NT/ND  Msk: moving all extremities spontaneously and purposefully  Neuro: CN II-XII grossly intact  Skin/wounds: bilateral wrists with splints in place and surgical dressing    Labs:  Renal:  Recent Labs     09/11/23 2101 09/12/23 1018 09/13/23 0825 09/14/23  0421   BUN 6.0* 5.2* 5.6* 6.2*   CREATININE 0.59 0.72 0.68 0.67     No results for input(s): "LACTIC" in the last 72 hours.    FEN/GI:  Recent Labs     09/11/23 2101 09/12/23 1018 09/13/23  0825 09/14/23  0421    139 138 137   K 3.5 4.2 3.7 3.8   CO2 23 25 25 24   CALCIUM 8.0* 8.7 8.4 9.1   ALBUMIN 3.1* 3.3* 3.0* 3.0*   BILITOT 0.6 0.6 0.7 0.8   * 108* 66* 50*   ALKPHOS 88 97 123 80   ALT " "168* 161* 104* 81*     Heme:  Recent Labs     09/12/23  1018 09/13/23  0244 09/13/23  0825 09/14/23  0802   HGB 10.2* 8.8* 8.8* 9.4*   HCT 30.7* 25.8* 26.2* 27.5*    173 173 212     ID:  Recent Labs     09/12/23  1018 09/13/23  0244 09/13/23  0825 09/14/23  0802   WBC 9.85 10.19 10.01 9.61     CBG:  Recent Labs     09/11/23  2101 09/12/23  1018 09/13/23  0825 09/14/23  0421   GLUCOSE 93 105* 97 100      No results for input(s): "POCTGLUCOSE" in the last 72 hours.   Cardiovascular:  No results for input(s): "TROPONINI", "CKTOTAL", "CKMB", "BNP" in the last 168 hours.  I have reviewed all pertinent lab results within the past 24 hours.    Imaging:  SURG FL Surgery Fluoro Usage   Final Result      X-Ray Wrist Complete Left   Final Result      Expected postoperative changes.         Electronically signed by: Daryl Ryan   Date:    09/12/2023   Time:    10:30      X-Ray Wrist Complete Right   Final Result      Expected postoperative changes.         Electronically signed by: Daryl Ryan   Date:    09/12/2023   Time:    10:29      X-Ray Wrist Complete Right   Final Result      As above.         Electronically signed by: Daryl Ryan   Date:    09/10/2023   Time:    21:33      X-Ray Wrist Complete Left   Final Result      As above.         Electronically signed by: Daryl Ryan   Date:    09/10/2023   Time:    21:25      CT Chest Abdomen Pelvis With Contrast (xpd)   Final Result   Addendum (preliminary) 1 of 1      Findings discussed with trauma surgery.  Below states hyper perfusion    throughout the right kidney and should read left kidney.  The left kidney    demonstrates laceration.  This was discussed with trauma surgery.         Electronically signed by: Daryl Ryan   Date:    09/10/2023   Time:    21:02      Final      Ossific density just lateral to the femoral head is of unknown etiology.  Possibly tendinous calcification however the patient is young. Fracture of the right 1st through 3rd " transverse processes.      Hypoperfusion throughout the right kidney with surrounding hematoma consistent with laceration.  This is greatest within the midpole in the upper pole.      Hepatic laceration along the right inferior aspect.      Splenic fracture which traverses the entirety of the spleen with no definite active extravasation identified, however there is surrounding hematoma.      Trauma surgery aware of splenic and renal injuries at the time of scan.         Electronically signed by: Daryl Ryan   Date:    09/10/2023   Time:    20:47      CT Cervical Spine Without Contrast   Final Result      1. No acute cervical spine abnormality identified.      2. Ligament, spinal cord and/or vascular abnormalities cannot be excluded on the basis of this examination.         Electronically signed by: Daryl Ryan   Date:    09/10/2023   Time:    20:38      CT Head Without Contrast   Final Result      No acute intracranial abnormality identified.         Electronically signed by: Daryl Ryan   Date:    09/10/2023   Time:    20:37      X-Ray Wrist Complete Right   Final Result      As above.         Electronically signed by: Daryl Ryan   Date:    09/10/2023   Time:    20:33      X-Ray Pelvis Routine AP   Final Result      As above.         Electronically signed by: Daryl Ryan   Date:    09/10/2023   Time:    20:31      X-Ray Chest 1 View   Final Result      No acute cardiopulmonary process.         Electronically signed by: Daryl Ryan   Date:    09/10/2023   Time:    20:30         I have reviewed all pertinent imaging results/findings within the past 24 hours.    Micro/Path/Other:  Microbiology Results (last 7 days)       ** No results found for the last 168 hours. **           Specimen (168h ago, onward)      None             Problems list:  Active Problem List with Overview Notes    Diagnosis Date Noted    Bipolar affective disorder, current episode mixed 09/14/2023    Closed fracture of left wrist  09/12/2023    Closed fracture of right wrist 09/12/2023    Injury resulting from fall from height 09/11/2023    Liver injury, laceration 09/11/2023    Laceration of spleen 09/11/2023    Renal laceration with open wound 09/11/2023    Fracture of transverse process of cervical vertebra, initial encounter 09/11/2023    Closed fracture of bilateral radius and ulna 09/11/2023        Assessment & Plan:   14-year-old female status post fall from tree who presented as a level 2 trauma upgraded to a level 1, noted to have right ulnar styloid and radial fractures, fractures of right transverse processes, right renal hematoma, right inferior aspect hepatic laceration, grade 5 splenic fracture without active extravasation. S/p ORIF to bilateral distal radius on 9/12/23. Patient is doing as expected post-operatively. Monitoring H/H.   Consults:   Orthopedic surgery   Therapy:  Physical Therapy  Occupational Therapy Weight bearing status:   RUE: WBAT to stand/transfer only, no ambulation.  LUE: WBAT to stand/transfer only, no ambulation.  RLE: WBAT  LLE: WBAT Precautions:  Fall and Standard   Seizure prophylaxis:  Not indicated. VTE prophylaxis:     Holding lovenox Patient is high risk of bleeding   GI prophylaxis:  H2B   Outpatient follow up:  Orthopedic surgery (Dr. Fong) in 2 weeks Disposition:  as per patient progress     - Renal diet  - Daily labs  Q daily H/H  -Awaiting Psych recs  -Continue working with PT/OT  -Case management has been consulted for placement following discharge  - MM pain control  - IS  - Therapy as above  - VTE prevention as above      Arron Yee MD  General Surgery

## 2023-10-19 ENCOUNTER — LAB (OUTPATIENT)
Dept: LAB | Facility: HOSPITAL | Age: 53
End: 2023-10-19
Payer: COMMERCIAL

## 2023-10-19 ENCOUNTER — OFFICE VISIT (OUTPATIENT)
Dept: GASTROENTEROLOGY | Facility: CLINIC | Age: 53
End: 2023-10-19
Payer: COMMERCIAL

## 2023-10-19 VITALS
HEIGHT: 61 IN | HEART RATE: 80 BPM | WEIGHT: 155 LBS | TEMPERATURE: 97.9 F | DIASTOLIC BLOOD PRESSURE: 78 MMHG | BODY MASS INDEX: 29.27 KG/M2 | SYSTOLIC BLOOD PRESSURE: 122 MMHG | OXYGEN SATURATION: 98 %

## 2023-10-19 DIAGNOSIS — K90.0 CELIAC DISEASE: ICD-10-CM

## 2023-10-19 DIAGNOSIS — K21.9 GASTROESOPHAGEAL REFLUX DISEASE, UNSPECIFIED WHETHER ESOPHAGITIS PRESENT: ICD-10-CM

## 2023-10-19 DIAGNOSIS — K90.0 CELIAC DISEASE: Primary | ICD-10-CM

## 2023-10-19 DIAGNOSIS — K59.00 CONSTIPATION, UNSPECIFIED CONSTIPATION TYPE: ICD-10-CM

## 2023-10-19 PROCEDURE — 86258 DGP ANTIBODY EACH IG CLASS: CPT

## 2023-10-19 PROCEDURE — 86231 EMA EACH IG CLASS: CPT

## 2023-10-19 PROCEDURE — 36415 COLL VENOUS BLD VENIPUNCTURE: CPT

## 2023-10-19 PROCEDURE — 99214 OFFICE O/P EST MOD 30 MIN: CPT | Performed by: NURSE PRACTITIONER

## 2023-10-19 PROCEDURE — 82784 ASSAY IGA/IGD/IGG/IGM EACH: CPT

## 2023-10-19 PROCEDURE — 86364 TISS TRNSGLTMNASE EA IG CLAS: CPT

## 2023-10-19 RX ORDER — PANTOPRAZOLE SODIUM 40 MG/1
40 TABLET, DELAYED RELEASE ORAL DAILY
Qty: 90 TABLET | Refills: 1 | Status: SHIPPED | OUTPATIENT
Start: 2023-10-19

## 2023-10-19 NOTE — PROGRESS NOTES
New Patient Consultation     Patient Name: Deysi Valentine  : 1970   MRN: [id number]     Chief Complaint:    Chief Complaint   Patient presents with    Celiac Disease       History of Present Illness: Deysi Valentine is a 53 y.o. female who is here today medical history of anemia, anxiety, arrhythmia, celiac disease, depression, hypertension for a Gastroenterology Consultation for celiac disease    Dx with celiac in 2017.  Found to be anemic and celiac was etiology.  She does well in general to avoid gluten. She tries to avoid cross contamination.  She has never seen a dietician but has a friend who is a nutritionist she can reference. She typically has a BM daily- between bristol scale 1-3.  She denies any blood in the stool.  She denies upper abdominal pain, nausea or vomiting.  She has chronic reflux that is controlled on pantoprazole. She avoids greasy foods.        UPPER GI ENDOSCOPY (2017 11:32 CDT) (Dr Painting)-grade a esophagitis, normal stomach and duodenum-biopsies consistent with celiac disease  COLONOSCOPY (2017 11:29 CDT) (Dr Painting)-one 5 mm hyperplastic polyp in the rectum, normal colon, 5 to 10-year recall    Abdominal surgical history includes appendectomy  Subjective      Review of Systems:   Review of Systems   Constitutional:  Negative for appetite change and unexpected weight loss.   HENT:  Negative for trouble swallowing.    Gastrointestinal:  Positive for constipation. Negative for abdominal distention, abdominal pain, anal bleeding, blood in stool, diarrhea, nausea, rectal pain, vomiting, GERD and indigestion.       Past Medical History:   Past Medical History:   Diagnosis Date    Anemia     Anxiety     Arrhythmia     Celiac disease     Celiac disease     Depression     Hx of colonic polyps     Hypertension     Recurrent major depressive disorder, in partial remission 2017       Past Surgical History:   Past Surgical History:   Procedure Laterality Date     APPENDECTOMY  5/2022    [facility]    CARDIAC ABLATION      COLONOSCOPY N/A 04/13/2017    Mild chronic inflammation changes constistent with sprue, Hyperplastic polyp 15 cm proximal to the anus repeat exam in 10 years    ENDOSCOPY N/A 04/13/2017    LA Grade A reflux esophagitis    UPPER GASTROINTESTINAL ENDOSCOPY         Family History:   Family History   Problem Relation Age of Onset    Heart attack Father 50    Depression Mother     Heart attack Mother 64    No Known Problems Sister     No Known Problems Brother     No Known Problems Daughter     No Known Problems Son     No Known Problems Maternal Grandmother     No Known Problems Paternal Grandmother     No Known Problems Maternal Aunt     No Known Problems Paternal Aunt     No Known Problems Sister     Other Sister         PSP    Colon cancer Neg Hx     Colon polyps Neg Hx     BRCA 1/2 Neg Hx     Breast cancer Neg Hx     Endometrial cancer Neg Hx     Ovarian cancer Neg Hx        Social History:   Social History     Socioeconomic History    Marital status:    Tobacco Use    Smoking status: Never    Smokeless tobacco: Never   Vaping Use    Vaping Use: Never used   Substance and Sexual Activity    Alcohol use: Yes     Alcohol/week: 1.0 standard drink of alcohol     Types: 1 Glasses of wine per week     Comment: Occ a glass of wine     Drug use: Never    Sexual activity: Yes     Partners: Male     Birth control/protection: None, Vasectomy     Comment: Spouse Vasectomy       Alcohol/Tobacco History:   Social History     Substance and Sexual Activity   Alcohol Use Yes    Alcohol/week: 1.0 standard drink of alcohol    Types: 1 Glasses of wine per week    Comment: Occ a glass of wine      Social History     Tobacco Use   Smoking Status Never   Smokeless Tobacco Never       Medications:     Current Outpatient Medications:     cholecalciferol (VITAMIN D3) 25 MCG (1000 UT) tablet, , Disp: , Rfl:     desvenlafaxine (PRISTIQ) 50 MG 24 hr tablet,  "Take 1 tablet by mouth Daily., Disp: 90 tablet, Rfl: 3    Krill Oil 1000 MG capsule, , Disp: , Rfl:     multivitamin with minerals tablet tablet, , Disp: , Rfl:     pantoprazole (PROTONIX) 40 MG EC tablet, Take 1 tablet by mouth Daily., Disp: 90 tablet, Rfl: 1    Allergies:   Allergies   Allergen Reactions    Amoxicillin Rash    Penicillins Rash       Objective     Physical Exam:  Vital Signs:   Vitals:    10/19/23 0821   BP: 122/78   BP Location: Right arm   Patient Position: Sitting   Cuff Size: Adult   Pulse: 80   Temp: 97.9 °F (36.6 °C)   TempSrc: Temporal   SpO2: 98%   Weight: 70.3 kg (155 lb)   Height: 154.9 cm (60.98\")     Body mass index is 29.3 kg/m².     Physical Exam  Constitutional:       General: She is not in acute distress.     Appearance: She is well-developed.   Pulmonary:      Effort: Pulmonary effort is normal. No accessory muscle usage or respiratory distress.   Abdominal:      General: Bowel sounds are normal.      Palpations: Abdomen is soft.   Skin:     Coloration: Skin is not pale.      Findings: No erythema.   Neurological:      Mental Status: She is alert and oriented to person, place, and time.   Psychiatric:         Speech: Speech normal.         Behavior: Behavior normal.         Thought Content: Thought content normal.         Judgment: Judgment normal.         Assessment / Plan      Assessment/Plan:   Diagnoses and all orders for this visit:    1. Celiac disease (Primary)  -     Celiac Comprehensive Panel; Future  Reiterated importance of checking labels and avoiding cross-contamination.  Discussed reading labels of skin care products as well.  Discussed resources online she may find to help such as Facebook groups.  2. Gastroesophageal reflux disease, unspecified whether esophagitis present  -     pantoprazole (PROTONIX) 40 MG EC tablet; Take 1 tablet by mouth Daily.  Dispense: 90 tablet; Refill: 1  -     Stable, continue current treatment plan    3. Constipation, unspecified " constipation type  Discussed gluten-free ways to increase dietary fiber such as increasing leafy green vegetables, using oatmeal, nuts, etc.  Recommend using stool softener as needed       Follow Up:   Return in about 1 year (around 10/19/2024), or if symptoms worsen or fail to improve.    Plan of care reviewed with the patient at the conclusion of today's visit.  Education was provided regarding diagnosis, management, and any prescribed or recommended OTC medications.  Patient verbalized understanding of and agreement with management plan.       CAMMY Amaya  [facility]

## 2023-10-20 LAB
ENDOMYSIUM IGA SER QL: NEGATIVE
GLIADIN PEPTIDE IGA SER-ACNC: 4 UNITS (ref 0–19)
GLIADIN PEPTIDE IGG SER-ACNC: 5 UNITS (ref 0–19)
IGA SERPL-MCNC: 89 MG/DL (ref 87–352)
TTG IGA SER-ACNC: <2 U/ML (ref 0–3)
TTG IGG SER-ACNC: 5 U/ML (ref 0–5)

## 2024-06-05 DIAGNOSIS — F33.41 RECURRENT MAJOR DEPRESSIVE DISORDER, IN PARTIAL REMISSION: ICD-10-CM

## 2024-06-06 RX ORDER — DESVENLAFAXINE SUCCINATE 50 MG/1
50 TABLET, EXTENDED RELEASE ORAL DAILY
Qty: 90 TABLET | Refills: 0 | Status: SHIPPED | OUTPATIENT
Start: 2024-06-06

## 2024-06-06 NOTE — TELEPHONE ENCOUNTER
Name: Serge Deysigurpreet Hanna    Relationship: Self    Best Callback Number: [phone]    PATIENT: SCHEDULED PER NOTE FOR NEXT AVAILABLE ON 9/5/24    ADDITIONAL INFORMATION:  PLEASE ADVISE IF THERE ARE ANY ISSUES WITH REFILLING THIS PRESCRIPTION

## 2024-08-25 DIAGNOSIS — K21.9 GASTROESOPHAGEAL REFLUX DISEASE, UNSPECIFIED WHETHER ESOPHAGITIS PRESENT: ICD-10-CM

## 2024-08-26 RX ORDER — PANTOPRAZOLE SODIUM 40 MG/1
40 TABLET, DELAYED RELEASE ORAL DAILY
Qty: 90 TABLET | Refills: 0 | Status: SHIPPED | OUTPATIENT
Start: 2024-08-26

## 2024-08-26 NOTE — TELEPHONE ENCOUNTER
Rx Refill Note  Pending Prescriptions:                       Disp   Refills    pantoprazole (PROTONIX) 40 MG EC tablet [P*90 tab*1        Sig: TAKE 1 TABLET BY MOUTH EVERY DAY    Last office visit with prescribing clinician: 10/19/2023   Last telemedicine visit with prescribing clinician: Visit date not found   Next office visit with prescribing clinician: Visit date not found         Inés Fields MA  08/26/24, 08:47 EDT

## 2024-08-31 DIAGNOSIS — F33.41 RECURRENT MAJOR DEPRESSIVE DISORDER, IN PARTIAL REMISSION: ICD-10-CM

## 2024-09-03 RX ORDER — DESVENLAFAXINE 50 MG/1
50 TABLET, FILM COATED, EXTENDED RELEASE ORAL DAILY
Qty: 90 TABLET | Refills: 0 | Status: SHIPPED | OUTPATIENT
Start: 2024-09-03 | End: 2024-09-05 | Stop reason: SDUPTHER

## 2024-09-04 ENCOUNTER — TRANSCRIBE ORDERS (OUTPATIENT)
Dept: ADMINISTRATIVE | Facility: HOSPITAL | Age: 54
End: 2024-09-04
Payer: COMMERCIAL

## 2024-09-04 DIAGNOSIS — Z12.31 SCREENING MAMMOGRAM FOR BREAST CANCER: Primary | ICD-10-CM

## 2024-09-05 ENCOUNTER — OFFICE VISIT (OUTPATIENT)
Dept: FAMILY MEDICINE CLINIC | Facility: CLINIC | Age: 54
End: 2024-09-05
Payer: COMMERCIAL

## 2024-09-05 VITALS
WEIGHT: 147 LBS | HEIGHT: 61 IN | RESPIRATION RATE: 16 BRPM | TEMPERATURE: 98.4 F | HEART RATE: 78 BPM | OXYGEN SATURATION: 96 % | BODY MASS INDEX: 27.75 KG/M2 | DIASTOLIC BLOOD PRESSURE: 82 MMHG | SYSTOLIC BLOOD PRESSURE: 128 MMHG

## 2024-09-05 DIAGNOSIS — Z00.00 ANNUAL PHYSICAL EXAM: Primary | ICD-10-CM

## 2024-09-05 DIAGNOSIS — F33.41 RECURRENT MAJOR DEPRESSIVE DISORDER, IN PARTIAL REMISSION: ICD-10-CM

## 2024-09-05 DIAGNOSIS — R51.9 FREQUENT HEADACHES: ICD-10-CM

## 2024-09-05 DIAGNOSIS — L30.9 DERMATITIS: ICD-10-CM

## 2024-09-05 DIAGNOSIS — R29.818 SUSPECTED SLEEP APNEA: ICD-10-CM

## 2024-09-05 DIAGNOSIS — E55.9 VITAMIN D DEFICIENCY: ICD-10-CM

## 2024-09-05 PROCEDURE — 99396 PREV VISIT EST AGE 40-64: CPT | Performed by: FAMILY MEDICINE

## 2024-09-05 RX ORDER — DESVENLAFAXINE 50 MG/1
50 TABLET, FILM COATED, EXTENDED RELEASE ORAL DAILY
Qty: 90 TABLET | Refills: 3 | Status: SHIPPED | OUTPATIENT
Start: 2024-09-05

## 2024-09-05 RX ORDER — TRIAMCINOLONE ACETONIDE 1 MG/G
1 CREAM TOPICAL 2 TIMES DAILY
Qty: 28.4 G | Refills: 0 | Status: SHIPPED | OUTPATIENT
Start: 2024-09-05 | End: 2024-09-15

## 2024-09-05 NOTE — PROGRESS NOTES
Chief Complaint  Annual Exam, Headache (Waking up with headaches often, x few months. Requesting sleep apnea test.), and Rash (Rash in labia area, started about 2 months ago. Itchy. )    Subjective          Deysi Valentine presents to [facility]    History of Present Illness  The patient presents for annual evaluation    She has been experiencing morning headaches, predominantly at the back of her head. These headaches often resolve spontaneously, but she occasionally takes Motrin for relief. She also reports a slight ache in her neck. There is a suspicion of sleep apnea, she has not undergone a sleep study. She reports occasional episodes of dry mouth upon waking, but denies cessation of breathing during sleep. The frequency of these headaches has decreased recently, but they were a daily occurrence during the summer months.  She has not tried changing her pillow.    She has been dealing with an external vaginal rash for several months, which she describes as similar to eczema. She has not applied any treatments. Her diet is low in dairy, primarily consisting of chicken and vegetables. She has been following the [employer] program for the past 2 months.     She is scheduled for a mammogram later this month. She had a Pap smear in 04/2023 and a colonoscopy in 2017.   She has not received the shingles vaccine.   She has been on Pristiq for 10 years and reports that mood is good. She had an eye checkup a few months ago.    IMMUNIZATIONS  Tdap: April 2023      The following portions of the patient's history were reviewed and updated as appropriate: allergies, current medications, past family history, past medical history, past social history, past surgical history, and problem list.    Objective      Physical Exam  Vitals reviewed.   Constitutional:       Appearance: Normal appearance.   HENT:      Right Ear: Tympanic membrane, ear canal and external ear normal.      Left Ear: Tympanic  membrane, ear canal and external ear normal.      Mouth/Throat:      Mouth: Mucous membranes are moist.      Pharynx: No oropharyngeal exudate.   Cardiovascular:      Rate and Rhythm: Normal rate.      Heart sounds: Normal heart sounds.   Pulmonary:      Effort: Pulmonary effort is normal.      Breath sounds: Normal breath sounds. No wheezing or rhonchi.   Musculoskeletal:         General: No swelling.      Cervical back: Neck supple.   Skin:            Comments: Mild erythematous skin changes left mons pubis, dry skin and blanchable   Neurological:      Mental Status: She is alert.        Physical Exam      Result Review :       Results               Assessment and Plan    Diagnoses and all orders for this visit:    1. Annual physical exam (Primary)  -     CBC & Differential; Future  -     Comprehensive Metabolic Panel; Future  -     Hemoglobin A1c; Future  -     Lipid Panel With / Chol / HDL Ratio; Future  -     Vitamin D,25-Hydroxy; Future  -     TSH; Future    2. Frequent headaches  -     CBC & Differential; Future  -     Comprehensive Metabolic Panel; Future  -     Hemoglobin A1c; Future  -     Lipid Panel With / Chol / HDL Ratio; Future  -     Vitamin D,25-Hydroxy; Future  -     TSH; Future    3. Suspected sleep apnea  -     Ambulatory Referral to Sleep Medicine  -     CBC & Differential; Future  -     Comprehensive Metabolic Panel; Future  -     Hemoglobin A1c; Future  -     Lipid Panel With / Chol / HDL Ratio; Future  -     Vitamin D,25-Hydroxy; Future  -     TSH; Future    4. Dermatitis  -     triamcinolone (KENALOG) 0.1 % cream; Apply 1 Application topically to the appropriate area as directed 2 (Two) Times a Day for 10 days.  Dispense: 28.4 g; Refill: 0  -     CBC & Differential; Future  -     Comprehensive Metabolic Panel; Future  -     Hemoglobin A1c; Future  -     Lipid Panel With / Chol / HDL Ratio; Future  -     Vitamin D,25-Hydroxy; Future  -     TSH; Future    5. Recurrent major depressive  disorder, in partial remission  Comments:  Mood is doing well with Pristiq, no changes  Orders:  -     Ambulatory Referral to Sleep Medicine  -     desvenlafaxine (PRISTIQ) 50 MG 24 hr tablet; Take 1 tablet by mouth Daily.  Dispense: 90 tablet; Refill: 3  -     CBC & Differential; Future  -     Comprehensive Metabolic Panel; Future  -     Hemoglobin A1c; Future  -     Lipid Panel With / Chol / HDL Ratio; Future  -     Vitamin D,25-Hydroxy; Future  -     TSH; Future    6. Vitamin D deficiency  -     CBC & Differential; Future  -     Comprehensive Metabolic Panel; Future  -     Hemoglobin A1c; Future  -     Lipid Panel With / Chol / HDL Ratio; Future  -     Vitamin D,25-Hydroxy; Future  -     TSH; Future      Assessment & Plan  1. Headaches.  The headaches are described as occurring in the back of the head and may be muscular or positional in nature. She is advised to try changing pillows to see if this alleviates the symptoms. A referral to a sleep specialist will be made to evaluate for potential sleep apnea, given the symptoms of waking up with headaches and dry mouth.    2. Dermatitis  A prescription for triamcinolone cream will be provided. She is advised to apply it for a week to 10 days and monitor for improvement. If there is no improvement, she should inform the office so that a referral to dermatology can be arranged.    3. Medication Management.  She is currently on desvenlafaxine (Pristiq) and reports it is effective. The prescription has been refilled for the year.    4. Health Maintenance.  She is scheduled for a mammogram later this month.   A routine colonoscopy was last performed in 2017, with a 10-year recall recommended.   She is advised to consider getting the shingles vaccine at local pharmacy.   Her Tdap is up to date as of last year. She is also reminded that flu shots will be available starting October 1.          Follow Up   No follow-ups on file.    Patient or patient representative  verbalized consent for the use of Ambient Listening during the visit with  Jennifer Chaves DO for chart documentation. 9/5/2024  19:20 EDT  Patient was given instructions and counseling regarding her condition or for health maintenance advice. Please see specific information pulled into the AVS if appropriate.

## 2024-09-06 ENCOUNTER — LAB (OUTPATIENT)
Dept: FAMILY MEDICINE CLINIC | Facility: CLINIC | Age: 54
End: 2024-09-06
Payer: COMMERCIAL

## 2024-09-06 DIAGNOSIS — F33.41 RECURRENT MAJOR DEPRESSIVE DISORDER, IN PARTIAL REMISSION: ICD-10-CM

## 2024-09-06 DIAGNOSIS — R29.818 SUSPECTED SLEEP APNEA: ICD-10-CM

## 2024-09-06 DIAGNOSIS — Z00.00 ANNUAL PHYSICAL EXAM: ICD-10-CM

## 2024-09-06 DIAGNOSIS — E55.9 VITAMIN D DEFICIENCY: ICD-10-CM

## 2024-09-06 DIAGNOSIS — L30.9 DERMATITIS: ICD-10-CM

## 2024-09-06 DIAGNOSIS — R51.9 FREQUENT HEADACHES: ICD-10-CM

## 2024-09-06 LAB
25(OH)D3+25(OH)D2 SERPL-MCNC: 101 NG/ML (ref 30–100)
ALBUMIN SERPL-MCNC: 4.5 G/DL (ref 3.5–5.2)
ALBUMIN/GLOB SERPL: 2.4 G/DL
ALP SERPL-CCNC: 98 U/L (ref 39–117)
ALT SERPL-CCNC: 26 U/L (ref 1–33)
AST SERPL-CCNC: 17 U/L (ref 1–32)
BASOPHILS # BLD AUTO: 0.03 10*3/MM3 (ref 0–0.2)
BASOPHILS NFR BLD AUTO: 0.7 % (ref 0–1.5)
BILIRUB SERPL-MCNC: 0.4 MG/DL (ref 0–1.2)
BUN SERPL-MCNC: 10 MG/DL (ref 6–20)
BUN/CREAT SERPL: 15.4 (ref 7–25)
CALCIUM SERPL-MCNC: 9.2 MG/DL (ref 8.6–10.5)
CHLORIDE SERPL-SCNC: 106 MMOL/L (ref 98–107)
CHOLEST SERPL-MCNC: 160 MG/DL (ref 0–200)
CHOLEST/HDLC SERPL: 4.1 {RATIO}
CO2 SERPL-SCNC: 24 MMOL/L (ref 22–29)
CREAT SERPL-MCNC: 0.65 MG/DL (ref 0.57–1)
EGFRCR SERPLBLD CKD-EPI 2021: 104.1 ML/MIN/1.73
EOSINOPHIL # BLD AUTO: 0.21 10*3/MM3 (ref 0–0.4)
EOSINOPHIL NFR BLD AUTO: 4.6 % (ref 0.3–6.2)
ERYTHROCYTE [DISTWIDTH] IN BLOOD BY AUTOMATED COUNT: 12.7 % (ref 12.3–15.4)
GLOBULIN SER CALC-MCNC: 1.9 GM/DL
GLUCOSE SERPL-MCNC: 89 MG/DL (ref 65–99)
HBA1C MFR BLD: 5.2 % (ref 4.8–5.6)
HCT VFR BLD AUTO: 44.6 % (ref 34–46.6)
HDLC SERPL-MCNC: 39 MG/DL (ref 40–60)
HGB BLD-MCNC: 15.1 G/DL (ref 12–15.9)
IMM GRANULOCYTES # BLD AUTO: 0.01 10*3/MM3 (ref 0–0.05)
IMM GRANULOCYTES NFR BLD AUTO: 0.2 % (ref 0–0.5)
LDLC SERPL CALC-MCNC: 107 MG/DL (ref 0–100)
LYMPHOCYTES # BLD AUTO: 1.42 10*3/MM3 (ref 0.7–3.1)
LYMPHOCYTES NFR BLD AUTO: 30.9 % (ref 19.6–45.3)
MCH RBC QN AUTO: 30.6 PG (ref 26.6–33)
MCHC RBC AUTO-ENTMCNC: 33.9 G/DL (ref 31.5–35.7)
MCV RBC AUTO: 90.5 FL (ref 79–97)
MONOCYTES # BLD AUTO: 0.46 10*3/MM3 (ref 0.1–0.9)
MONOCYTES NFR BLD AUTO: 10 % (ref 5–12)
NEUTROPHILS # BLD AUTO: 2.47 10*3/MM3 (ref 1.7–7)
NEUTROPHILS NFR BLD AUTO: 53.6 % (ref 42.7–76)
NRBC BLD AUTO-RTO: 0 /100 WBC (ref 0–0.2)
PLATELET # BLD AUTO: 241 10*3/MM3 (ref 140–450)
POTASSIUM SERPL-SCNC: 4.2 MMOL/L (ref 3.5–5.2)
PROT SERPL-MCNC: 6.4 G/DL (ref 6–8.5)
RBC # BLD AUTO: 4.93 10*6/MM3 (ref 3.77–5.28)
SODIUM SERPL-SCNC: 141 MMOL/L (ref 136–145)
TRIGL SERPL-MCNC: 73 MG/DL (ref 0–150)
TSH SERPL DL<=0.005 MIU/L-ACNC: 2.04 UIU/ML (ref 0.27–4.2)
VLDLC SERPL CALC-MCNC: 14 MG/DL (ref 5–40)
WBC # BLD AUTO: 4.6 10*3/MM3 (ref 3.4–10.8)

## 2024-09-08 DIAGNOSIS — E67.3 HYPERVITAMINOSIS D: Primary | ICD-10-CM

## 2024-09-15 LAB
NCCN CRITERIA FLAG: NORMAL
TYRER CUZICK SCORE: 8.6

## 2024-09-26 ENCOUNTER — HOSPITAL ENCOUNTER (OUTPATIENT)
Dept: MAMMOGRAPHY | Facility: HOSPITAL | Age: 54
Discharge: HOME | End: 2024-09-26
Admitting: FAMILY MEDICINE
Payer: COMMERCIAL

## 2024-09-26 DIAGNOSIS — Z12.31 SCREENING MAMMOGRAM FOR BREAST CANCER: ICD-10-CM

## 2024-09-26 PROCEDURE — 77063 BREAST TOMOSYNTHESIS BI: CPT

## 2024-09-26 PROCEDURE — 77067 SCR MAMMO BI INCL CAD: CPT

## 2024-10-28 ENCOUNTER — LAB (OUTPATIENT)
Dept: FAMILY MEDICINE CLINIC | Facility: CLINIC | Age: 54
End: 2024-10-28
Payer: COMMERCIAL

## 2024-10-28 DIAGNOSIS — E67.3 HYPERVITAMINOSIS D: ICD-10-CM

## 2024-10-29 LAB — 25(OH)D3+25(OH)D2 SERPL-MCNC: 78 NG/ML (ref 30–100)

## 2024-12-11 ENCOUNTER — OFFICE VISIT (OUTPATIENT)
Dept: SLEEP MEDICINE | Facility: CLINIC | Age: 54
End: 2024-12-11
Payer: COMMERCIAL

## 2024-12-11 VITALS
OXYGEN SATURATION: 98 % | DIASTOLIC BLOOD PRESSURE: 82 MMHG | TEMPERATURE: 97.8 F | HEIGHT: 61 IN | HEART RATE: 83 BPM | BODY MASS INDEX: 24.92 KG/M2 | WEIGHT: 132 LBS | SYSTOLIC BLOOD PRESSURE: 128 MMHG

## 2024-12-11 DIAGNOSIS — R51.9 MORNING HEADACHE: ICD-10-CM

## 2024-12-11 DIAGNOSIS — R41.840 LACK OF CONCENTRATION: ICD-10-CM

## 2024-12-11 DIAGNOSIS — R06.83 SNORING: Primary | ICD-10-CM

## 2024-12-11 PROCEDURE — 99213 OFFICE O/P EST LOW 20 MIN: CPT | Performed by: NURSE PRACTITIONER

## 2024-12-11 NOTE — PROGRESS NOTES
Chief Complaint:   Chief Complaint   Patient presents with    Sleeping Problem    Snoring       HPI:    Deysi Valentine is a 54 y.o. female here to establish care.  Patient sees Dr. Chaves for primary care.  Patient has medical history as below:  Past Medical History:   Diagnosis Date    Anemia     Anxiety     Arrhythmia     Celiac disease     Celiac disease     Colon polyp 4/2017    1 found in scope    Depression     GERD (gastroesophageal reflux disease) 5/21    Hx of colonic polyps     Hypertension     Recurrent major depressive disorder, in partial remission 11/29/2017    Urinary tract infection        Patient has a 2 to 3-year history of snoring, heartburn, dry mouth, and lack of concentration.  She does have some gasping in the night and morning headaches that are worsening to 2-3 times a week.  Patient has no history of nasal fracture or head injury.  Patient was sent here today to see if sleep apnea is playing a role in current symptoms or past diagnoses.    Patient keep same sleep schedule weekend and weekday going to bed at 10 PM getting up at 6 AM.  She estimates she is getting 7 to 8 hours of sleep nightly.  It will take her 30 minutes to go to sleep and she does not get up during the night.  Patient has an [place] score of 4/24.    We did speak today about the consequences of untreated sleep apnea such as hypertension, atrial fibrillation, stroke, early onset dementia and sudden cardiac death.  We also discussed different therapies available to her such as CPAP, MAD, or ENT referral.  Patient verbalizes understanding.    Social history  This is a very pleasant 54-year-old female.  Patient is a pharmacy technician.  Patient is a non-smoker and nondrinker.  She has 2 cups of coffee daily.    Family History   Problem Relation Age of Onset    Heart attack Father 50    Depression Mother     Heart attack Mother 64    No Known Problems Sister     No Known Problems Brother     No Known Problems Daughter     No  Known Problems Son     No Known Problems Maternal Grandmother     No Known Problems Paternal Grandmother     No Known Problems Maternal Aunt     No Known Problems Paternal Aunt     No Known Problems Sister     Colon cancer Neg Hx     Colon polyps Neg Hx     BRCA 1/2 Neg Hx     Breast cancer Neg Hx     Endometrial cancer Neg Hx     Ovarian cancer Neg Hx      Past Surgical History:   Procedure Laterality Date    APPENDECTOMY  5/2022    [facility]    CARDIAC ABLATION      COLONOSCOPY N/A 04/13/2017    Mild chronic inflammation changes constistent with sprue, Hyperplastic polyp 15 cm proximal to the anus repeat exam in 10 years    ENDOSCOPY N/A 04/13/2017    LA Grade A reflux esophagitis    UPPER GASTROINTESTINAL ENDOSCOPY             Current medications are:   Current Outpatient Medications:     cholecalciferol (VITAMIN D3) 25 MCG (1000 UT) tablet, , Disp: , Rfl:     desvenlafaxine (PRISTIQ) 50 MG 24 hr tablet, Take 1 tablet by mouth Daily., Disp: 90 tablet, Rfl: 3    Krill Oil 1000 MG capsule, , Disp: , Rfl:     multivitamin with minerals tablet tablet, , Disp: , Rfl: .      The patient's relevant past medical, surgical, family and social history were reviewed and updated in Epic as appropriate.       Review of Systems   Respiratory:  Positive for choking.    Cardiovascular:  Positive for chest pain.   Neurological:  Positive for headaches.   Psychiatric/Behavioral:  Positive for decreased concentration, dysphoric mood and sleep disturbance. The patient is nervous/anxious.    All other systems reviewed and are negative.        Objective:    Physical Exam  Constitutional:       Appearance: Normal appearance.   HENT:      Head: Normocephalic and atraumatic.      Mouth/Throat:      Mouth: Mucous membranes are moist.      Pharynx: Oropharynx is clear.      Comments: Mallampati 4 anatomy  Cardiovascular:      Rate and Rhythm: Normal rate and regular rhythm.   Pulmonary:      Effort: Pulmonary effort is  "normal.      Breath sounds: Normal breath sounds.   Skin:     General: Skin is warm and dry.   Neurological:      Mental Status: She is alert and oriented to person, place, and time.   Psychiatric:         Mood and Affect: Mood normal.         Behavior: Behavior normal.         Thought Content: Thought content normal.         Judgment: Judgment normal.       /82   Pulse 83   Temp 97.8 °F (36.6 °C)   Ht 154.9 cm (60.98\")   Wt 59.9 kg (132 lb)   LMP 03/01/2021   SpO2 98%   BMI 24.95 kg/m²         ASSESSMENT/PLAN    Diagnoses and all orders for this visit:    1. Snoring (Primary)  -     Home Sleep Study; Future    2. Morning headache  -     Home Sleep Study; Future    3. Lack of concentration  -     Home Sleep Study; Future        Counseled patient regarding multimodal approach with healthy nutrition, healthy sleep, regular physical activity, social activities, counseling, and medications. Encouraged to practice lateral sleep position. Avoid alcohol and sedatives close to bedtime.    Patient certainly has a strong story for sleep apnea and due to the consequences of untreated sleep apnea we will move forward with HST and follow-up as appropriate.  Thank you for this kind referral.      Signed by  Cori Torres, CAMMY    December 11, 2024      CC: Jennifer Chaves DO Kiser, Makayla Ann, DO      "

## 2025-01-24 ENCOUNTER — HOSPITAL ENCOUNTER (OUTPATIENT)
Dept: SLEEP MEDICINE | Facility: HOSPITAL | Age: 55
Discharge: HOME | End: 2025-01-24
Admitting: NURSE PRACTITIONER
Payer: COMMERCIAL

## 2025-01-24 VITALS — HEIGHT: 61 IN | WEIGHT: 132.06 LBS | BODY MASS INDEX: 24.93 KG/M2

## 2025-01-24 DIAGNOSIS — R41.840 LACK OF CONCENTRATION: ICD-10-CM

## 2025-01-24 DIAGNOSIS — R06.83 SNORING: ICD-10-CM

## 2025-01-24 DIAGNOSIS — R51.9 MORNING HEADACHE: ICD-10-CM

## 2025-01-24 PROCEDURE — 95800 SLP STDY UNATTENDED: CPT

## 2025-01-27 DIAGNOSIS — G47.33 OSA (OBSTRUCTIVE SLEEP APNEA): Primary | ICD-10-CM

## 2025-04-14 ENCOUNTER — OFFICE VISIT (OUTPATIENT)
Dept: SLEEP MEDICINE | Facility: CLINIC | Age: 55
End: 2025-04-14
Payer: COMMERCIAL

## 2025-04-14 VITALS
DIASTOLIC BLOOD PRESSURE: 78 MMHG | TEMPERATURE: 97.8 F | WEIGHT: 127 LBS | HEIGHT: 61 IN | OXYGEN SATURATION: 99 % | HEART RATE: 78 BPM | SYSTOLIC BLOOD PRESSURE: 120 MMHG | BODY MASS INDEX: 23.98 KG/M2

## 2025-04-14 DIAGNOSIS — G47.33 OSA (OBSTRUCTIVE SLEEP APNEA): Primary | ICD-10-CM

## 2025-04-14 PROCEDURE — 99213 OFFICE O/P EST LOW 20 MIN: CPT | Performed by: NURSE PRACTITIONER

## 2025-04-14 NOTE — PROGRESS NOTES
Chief Complaint:   Chief Complaint   Patient presents with    Follow-up    Sleeping Problem       HPI:    Deysi Valentine is a 54 y.o. female here for follow-up of sleep apnea.  Patient was seen in consult 12/11/2024    Patient had a 2 to 3-year history of snoring, heartburn, dry mouth, and lack of concentration.  She does have some gasping in the night and morning headaches that are worsening to 2-3 times a week.  Patient has no history of nasal fracture or head injury.     Patient had a sleep study 1/24/2025 that showed moderate obstructive sleep apnea and she did initiate CPAP therapy.  Patient states she is doing very well with CPAP and feeling rested.  She is no longer having morning headaches.  She is sleeping 7 hours nightly.  She goes to sleep easily and does not get up during the night.  Patient has an [place] score of 6/24.  Patient is doing well without concern or complaint today and will continue therapy.  Current medications are:   Current Outpatient Medications:     cholecalciferol (VITAMIN D3) 25 MCG (1000 UT) tablet, , Disp: , Rfl:     desvenlafaxine (PRISTIQ) 50 MG 24 hr tablet, Take 1 tablet by mouth Daily., Disp: 90 tablet, Rfl: 3    Krill Oil 1000 MG capsule, , Disp: , Rfl:     multivitamin with minerals tablet tablet, , Disp: , Rfl: .      The patient's relevant past medical, surgical, family and social history were reviewed and updated in Epic as appropriate.       Review of Systems   Respiratory:  Positive for apnea.    Psychiatric/Behavioral:  Positive for decreased concentration, dysphoric mood and sleep disturbance. The patient is nervous/anxious.    All other systems reviewed and are negative.        Objective:    Physical Exam  Constitutional:       Appearance: Normal appearance.   HENT:      Head: Normocephalic and atraumatic.      Mouth/Throat:      Comments: Mallampati 4 anatomy  Cardiovascular:      Rate and Rhythm: Normal rate and regular rhythm.   Pulmonary:      Effort: Pulmonary  "effort is normal.      Breath sounds: Normal breath sounds.   Skin:     General: Skin is warm and dry.   Neurological:      Mental Status: She is alert and oriented to person, place, and time.   Psychiatric:         Mood and Affect: Mood normal.         Behavior: Behavior normal.         Thought Content: Thought content normal.         Judgment: Judgment normal.     /78   Pulse 78   Temp 97.8 °F (36.6 °C)   Ht 154.9 cm (60.98\")   Wt 57.6 kg (127 lb)   LMP 03/01/2021   SpO2 99%   BMI 24.01 kg/m²       CPAP Report  45/46 days of use  Greater than 4-hour use 96%  Setting 818  95th percentile pressure 11.0  AHI of 2.3    The patient continues to use and benefit from CPAP therapy.    ASSESSMENT/PLAN    Diagnoses and all orders for this visit:    1. JONATAN (obstructive sleep apnea) (Primary)  -     PAP Therapy        Counseled patient regarding multimodal approach with healthy nutrition, healthy sleep, regular physical activity, social activities, counseling, and medications. Encouraged to practice lateral sleep position. Avoid alcohol and sedatives close to bedtime.    Refill supplies x 1 year.  Return to clinic 1 year or sooner as symptoms warrant.    Signed by  Cori Torres, APRN    April 14, 2025      CC: Jennifer Chaves, DO         No ref. provider found      "

## 2025-07-10 PROCEDURE — 87077 CULTURE AEROBIC IDENTIFY: CPT | Performed by: FAMILY MEDICINE

## 2025-07-10 PROCEDURE — 87086 URINE CULTURE/COLONY COUNT: CPT | Performed by: FAMILY MEDICINE

## 2025-07-10 PROCEDURE — 87186 SC STD MICRODIL/AGAR DIL: CPT | Performed by: FAMILY MEDICINE

## (undated) DEVICE — FRCP BX RADJAW4 NDL 2.8 240 STD OG

## (undated) DEVICE — SENSR O2 OXIMAX FNGR A/ 18IN NONSTR

## (undated) DEVICE — THE SINGLE USE ETRAP – POLYP TRAP IS USED FOR SUCTION RETRIEVAL OF ENDOSCOPICALLY REMOVED POLYPS.: Brand: ETRAP

## (undated) DEVICE — SNAR POLYP SENSATION MICRO OVL 13 240X40

## (undated) DEVICE — ENDOGATOR AUXILIARY WATER JET CONNECTOR: Brand: ENDOGATOR

## (undated) DEVICE — TBG SMPL FLTR LINE NASL 02/C02 A/ BX/100

## (undated) DEVICE — Device: Brand: DEFENDO AIR/WATER/SUCTION AND BIOPSY VALVE

## (undated) DEVICE — THE CHANNEL CLEANING BRUSH IS A NYLON FLEXI BRUSH ATTACHED TO A FLEXIBLE PLASTIC SHEATH DESIGNED TO SAFELY REMOVE DEBRIS FROM FLEXIBLE ENDOSCOPES.

## (undated) DEVICE — CUFF,BP,DISP,1 TUBE,ADULT,HP: Brand: MEDLINE

## (undated) DEVICE — MSK O2 MD CONCENTR A/ LF 7FT 1P/U